# Patient Record
Sex: FEMALE | Race: BLACK OR AFRICAN AMERICAN | NOT HISPANIC OR LATINO | Employment: FULL TIME | URBAN - METROPOLITAN AREA
[De-identification: names, ages, dates, MRNs, and addresses within clinical notes are randomized per-mention and may not be internally consistent; named-entity substitution may affect disease eponyms.]

---

## 2023-02-26 PROBLEM — R73.03 PREDIABETES: Status: ACTIVE | Noted: 2023-02-26

## 2023-02-26 PROBLEM — M21.42 FLAT FEET: Status: ACTIVE | Noted: 2023-02-26

## 2023-02-26 PROBLEM — R10.2 PELVIC PRESSURE IN FEMALE: Status: ACTIVE | Noted: 2023-02-26

## 2023-02-26 PROBLEM — M25.562 PAIN IN LEFT KNEE: Status: ACTIVE | Noted: 2023-02-26

## 2023-02-26 PROBLEM — M25.561 PAIN IN RIGHT KNEE: Status: ACTIVE | Noted: 2023-02-26

## 2023-02-26 PROBLEM — R10.9 LEFT FLANK PAIN: Status: ACTIVE | Noted: 2023-02-26

## 2023-02-26 PROBLEM — R53.83 FATIGUE: Status: ACTIVE | Noted: 2023-02-26

## 2023-02-26 PROBLEM — M25.462 SWOLLEN L KNEE: Status: ACTIVE | Noted: 2023-02-26

## 2023-02-26 PROBLEM — G47.30 SLEEP-DISORDERED BREATHING: Status: ACTIVE | Noted: 2023-02-26

## 2023-02-26 PROBLEM — E55.9 VITAMIN D DEFICIENCY: Status: ACTIVE | Noted: 2023-02-26

## 2023-02-26 PROBLEM — E66.01 OBESITY, MORBID, BMI 50 OR HIGHER (MULTI): Status: ACTIVE | Noted: 2023-02-26

## 2023-02-26 PROBLEM — I10 ESSENTIAL HYPERTENSION: Status: ACTIVE | Noted: 2023-02-26

## 2023-02-26 PROBLEM — G89.29 CHRONIC LOW BACK PAIN: Status: ACTIVE | Noted: 2023-02-26

## 2023-02-26 PROBLEM — R29.898 TRANSIENT WEAKNESS OF LEFT LOWER EXTREMITY: Status: ACTIVE | Noted: 2023-02-26

## 2023-02-26 PROBLEM — N18.31 STAGE 3A CHRONIC KIDNEY DISEASE (MULTI): Status: ACTIVE | Noted: 2023-02-26

## 2023-02-26 PROBLEM — M54.50 CHRONIC LOW BACK PAIN: Status: ACTIVE | Noted: 2023-02-26

## 2023-02-26 PROBLEM — G47.30 APNEA, SLEEP: Status: ACTIVE | Noted: 2023-02-26

## 2023-02-26 PROBLEM — R60.0 LOWER LEG EDEMA: Status: ACTIVE | Noted: 2023-02-26

## 2023-02-26 PROBLEM — R35.0 URINARY FREQUENCY: Status: ACTIVE | Noted: 2023-02-26

## 2023-02-26 PROBLEM — R46.89 NON-COMPLIANT BEHAVIOR: Status: ACTIVE | Noted: 2023-02-26

## 2023-02-26 PROBLEM — R40.0 HAS DAYTIME DROWSINESS: Status: ACTIVE | Noted: 2023-02-26

## 2023-02-26 PROBLEM — R35.1 NOCTURIA: Status: ACTIVE | Noted: 2023-02-26

## 2023-02-26 PROBLEM — E03.9 HYPOTHYROIDISM: Status: ACTIVE | Noted: 2023-02-26

## 2023-02-26 PROBLEM — R26.2 DIFFICULTY WALKING: Status: ACTIVE | Noted: 2023-02-26

## 2023-02-26 PROBLEM — F43.9 STRESS: Status: ACTIVE | Noted: 2023-02-26

## 2023-02-26 PROBLEM — J40 BRONCHITIS: Status: ACTIVE | Noted: 2023-02-26

## 2023-02-26 PROBLEM — R11.0 NAUSEA IN ADULT: Status: ACTIVE | Noted: 2023-02-26

## 2023-02-26 PROBLEM — I10 UNCONTROLLED HYPERTENSION: Status: ACTIVE | Noted: 2023-02-26

## 2023-02-26 PROBLEM — H81.13 BENIGN PAROXYSMAL POSITIONAL VERTIGO, BILATERAL: Status: ACTIVE | Noted: 2023-02-26

## 2023-02-26 PROBLEM — R09.81 SINUS CONGESTION: Status: ACTIVE | Noted: 2023-02-26

## 2023-02-26 PROBLEM — R60.0 BILATERAL EDEMA OF LOWER EXTREMITY: Status: ACTIVE | Noted: 2023-02-26

## 2023-02-26 PROBLEM — E78.5 HYPERLIPIDEMIA: Status: ACTIVE | Noted: 2023-02-26

## 2023-02-26 PROBLEM — M79.672 INTRACTABLE LEFT HEEL PAIN: Status: ACTIVE | Noted: 2023-02-26

## 2023-02-26 PROBLEM — R29.898 WEAKNESS OF LEFT LOWER EXTREMITY: Status: ACTIVE | Noted: 2023-02-26

## 2023-02-26 PROBLEM — H53.9 CHANGES IN VISION: Status: ACTIVE | Noted: 2023-02-26

## 2023-02-26 PROBLEM — M25.552 HIP PAIN, LEFT: Status: ACTIVE | Noted: 2023-02-26

## 2023-02-26 PROBLEM — R06.83 SNORING: Status: ACTIVE | Noted: 2023-02-26

## 2023-02-26 PROBLEM — N39.0 UTI (URINARY TRACT INFECTION): Status: ACTIVE | Noted: 2023-02-26

## 2023-02-26 PROBLEM — R29.898 LEG WEAKNESS: Status: ACTIVE | Noted: 2023-02-26

## 2023-02-26 PROBLEM — M21.41 FLAT FEET: Status: ACTIVE | Noted: 2023-02-26

## 2023-02-26 PROBLEM — H40.9 GLAUCOMA: Status: ACTIVE | Noted: 2023-02-26

## 2023-02-26 PROBLEM — R05.9 COUGH: Status: ACTIVE | Noted: 2023-02-26

## 2023-02-26 PROBLEM — R31.29 MICROSCOPIC HEMATURIA: Status: ACTIVE | Noted: 2023-02-26

## 2023-02-26 RX ORDER — FLUTICASONE PROPIONATE 50 MCG
2 SPRAY, SUSPENSION (ML) NASAL DAILY
COMMUNITY
Start: 2021-10-27 | End: 2024-02-23 | Stop reason: SDUPTHER

## 2023-02-26 RX ORDER — ACETAMINOPHEN 500 MG
500-1000 TABLET ORAL
COMMUNITY
Start: 2022-08-04 | End: 2023-11-21 | Stop reason: SDUPTHER

## 2023-02-26 RX ORDER — AMLODIPINE BESYLATE 10 MG/1
10 TABLET ORAL DAILY
COMMUNITY
Start: 2018-04-20 | End: 2023-04-13 | Stop reason: SDUPTHER

## 2023-02-26 RX ORDER — TOPIRAMATE 50 MG/1
50 TABLET, FILM COATED ORAL 2 TIMES DAILY
COMMUNITY
Start: 2020-06-30 | End: 2023-04-13 | Stop reason: SDUPTHER

## 2023-02-26 RX ORDER — MECLIZINE HCL 25MG 25 MG/1
25 TABLET, CHEWABLE ORAL EVERY 8 HOURS
COMMUNITY
Start: 2022-08-11 | End: 2024-04-15 | Stop reason: ALTCHOICE

## 2023-02-26 RX ORDER — PROMETHAZINE HYDROCHLORIDE AND DEXTROMETHORPHAN HYDROBROMIDE 6.25; 15 MG/5ML; MG/5ML
10 SYRUP ORAL EVERY 8 HOURS PRN
COMMUNITY
Start: 2021-10-27 | End: 2023-06-08 | Stop reason: ALTCHOICE

## 2023-02-26 RX ORDER — ERGOCALCIFEROL 1.25 MG/1
1.25 CAPSULE ORAL
COMMUNITY
Start: 2018-02-08 | End: 2023-04-13 | Stop reason: SDUPTHER

## 2023-02-26 RX ORDER — LEVOTHYROXINE SODIUM 137 UG/1
137 TABLET ORAL DAILY
COMMUNITY
Start: 2018-12-26 | End: 2023-04-13 | Stop reason: SDUPTHER

## 2023-02-26 RX ORDER — ROSUVASTATIN CALCIUM 10 MG/1
10 TABLET, COATED ORAL NIGHTLY
COMMUNITY
Start: 2019-10-23 | End: 2023-06-08 | Stop reason: ALTCHOICE

## 2023-02-26 RX ORDER — METOPROLOL TARTRATE 25 MG/1
25 TABLET, FILM COATED ORAL 2 TIMES DAILY
COMMUNITY
Start: 2020-06-30 | End: 2023-04-13 | Stop reason: SDUPTHER

## 2023-02-26 RX ORDER — LISINOPRIL AND HYDROCHLOROTHIAZIDE 20; 25 MG/1; MG/1
1 TABLET ORAL DAILY
COMMUNITY
End: 2023-04-13 | Stop reason: SDUPTHER

## 2023-02-26 RX ORDER — ONDANSETRON 4 MG/1
4-8 TABLET, FILM COATED ORAL EVERY 8 HOURS PRN
COMMUNITY
Start: 2022-08-11 | End: 2023-06-08 | Stop reason: ALTCHOICE

## 2023-02-26 RX ORDER — GUAIFENESIN 400 MG/1
400 TABLET ORAL EVERY 4 HOURS PRN
COMMUNITY
Start: 2021-10-27 | End: 2023-06-08 | Stop reason: ALTCHOICE

## 2023-03-07 NOTE — PROGRESS NOTES
Subjective    Barbara Schultz is a 67 y.o. female who presents for No chief complaint on file..  HPI    An ROS was completed and all systems are negative with the exception of what's noted in the HPI.    Objective   Physical Exam    Assessment/Plan   Problem List Items Addressed This Visit    None

## 2023-03-08 ENCOUNTER — APPOINTMENT (OUTPATIENT)
Dept: PRIMARY CARE | Facility: CLINIC | Age: 68
End: 2023-03-08
Payer: COMMERCIAL

## 2023-04-13 ENCOUNTER — OFFICE VISIT (OUTPATIENT)
Dept: PRIMARY CARE | Facility: CLINIC | Age: 68
End: 2023-04-13
Payer: COMMERCIAL

## 2023-04-13 VITALS
RESPIRATION RATE: 12 BRPM | WEIGHT: 293 LBS | BODY MASS INDEX: 51.91 KG/M2 | TEMPERATURE: 97.2 F | SYSTOLIC BLOOD PRESSURE: 125 MMHG | HEART RATE: 69 BPM | DIASTOLIC BLOOD PRESSURE: 80 MMHG | OXYGEN SATURATION: 96 % | HEIGHT: 63 IN

## 2023-04-13 DIAGNOSIS — E55.9 VITAMIN D DEFICIENCY: ICD-10-CM

## 2023-04-13 DIAGNOSIS — Z12.11 COLON CANCER SCREENING: ICD-10-CM

## 2023-04-13 DIAGNOSIS — M54.42 LEFT-SIDED LOW BACK PAIN WITH LEFT-SIDED SCIATICA, UNSPECIFIED CHRONICITY: Primary | ICD-10-CM

## 2023-04-13 DIAGNOSIS — R51.9 NONINTRACTABLE HEADACHE, UNSPECIFIED CHRONICITY PATTERN, UNSPECIFIED HEADACHE TYPE: ICD-10-CM

## 2023-04-13 DIAGNOSIS — G47.33 OSA (OBSTRUCTIVE SLEEP APNEA): ICD-10-CM

## 2023-04-13 DIAGNOSIS — E78.5 HYPERLIPIDEMIA, UNSPECIFIED HYPERLIPIDEMIA TYPE: ICD-10-CM

## 2023-04-13 DIAGNOSIS — R32 URINARY INCONTINENCE, UNSPECIFIED TYPE: ICD-10-CM

## 2023-04-13 DIAGNOSIS — Z63.4 BEREAVEMENT: ICD-10-CM

## 2023-04-13 DIAGNOSIS — I10 ESSENTIAL HYPERTENSION: ICD-10-CM

## 2023-04-13 DIAGNOSIS — E66.01 OBESITY, MORBID, BMI 50 OR HIGHER (MULTI): ICD-10-CM

## 2023-04-13 DIAGNOSIS — E03.9 HYPOTHYROIDISM, UNSPECIFIED TYPE: ICD-10-CM

## 2023-04-13 PROCEDURE — 3079F DIAST BP 80-89 MM HG: CPT | Performed by: NURSE PRACTITIONER

## 2023-04-13 PROCEDURE — 99215 OFFICE O/P EST HI 40 MIN: CPT | Performed by: NURSE PRACTITIONER

## 2023-04-13 PROCEDURE — 3074F SYST BP LT 130 MM HG: CPT | Performed by: NURSE PRACTITIONER

## 2023-04-13 PROCEDURE — 1036F TOBACCO NON-USER: CPT | Performed by: NURSE PRACTITIONER

## 2023-04-13 PROCEDURE — 1159F MED LIST DOCD IN RCRD: CPT | Performed by: NURSE PRACTITIONER

## 2023-04-13 RX ORDER — ERGOCALCIFEROL 1.25 MG/1
1.25 CAPSULE ORAL
Qty: 6 CAPSULE | Refills: 2 | Status: SHIPPED | OUTPATIENT
Start: 2023-04-13 | End: 2024-02-23 | Stop reason: SDUPTHER

## 2023-04-13 RX ORDER — LISINOPRIL AND HYDROCHLOROTHIAZIDE 20; 25 MG/1; MG/1
1 TABLET ORAL DAILY
Qty: 90 TABLET | Refills: 3 | Status: SHIPPED | OUTPATIENT
Start: 2023-04-13 | End: 2023-06-08 | Stop reason: SDUPTHER

## 2023-04-13 RX ORDER — DULOXETIN HYDROCHLORIDE 30 MG/1
CAPSULE, DELAYED RELEASE ORAL
Qty: 60 CAPSULE | Refills: 1 | Status: SHIPPED | OUTPATIENT
Start: 2023-04-13 | End: 2024-04-16

## 2023-04-13 RX ORDER — LEVOTHYROXINE SODIUM 137 UG/1
137 TABLET ORAL DAILY
Qty: 90 TABLET | Refills: 3 | Status: SHIPPED | OUTPATIENT
Start: 2023-04-13 | End: 2023-05-16 | Stop reason: SDUPTHER

## 2023-04-13 RX ORDER — METOPROLOL TARTRATE 25 MG/1
25 TABLET, FILM COATED ORAL 2 TIMES DAILY
Qty: 90 TABLET | Refills: 3 | Status: SHIPPED | OUTPATIENT
Start: 2023-04-13 | End: 2023-09-08 | Stop reason: SDUPTHER

## 2023-04-13 RX ORDER — GABAPENTIN 100 MG/1
CAPSULE ORAL
Qty: 90 CAPSULE | Refills: 5 | Status: SHIPPED | OUTPATIENT
Start: 2023-04-13 | End: 2024-02-23 | Stop reason: SDUPTHER

## 2023-04-13 RX ORDER — TOPIRAMATE 50 MG/1
50 TABLET, FILM COATED ORAL 2 TIMES DAILY
Qty: 90 TABLET | Refills: 3 | Status: SHIPPED | OUTPATIENT
Start: 2023-04-13 | End: 2023-09-08 | Stop reason: SDUPTHER

## 2023-04-13 RX ORDER — AMLODIPINE BESYLATE 10 MG/1
10 TABLET ORAL DAILY
Qty: 90 TABLET | Refills: 3 | Status: SHIPPED | OUTPATIENT
Start: 2023-04-13 | End: 2023-06-08 | Stop reason: SDUPTHER

## 2023-04-13 SDOH — SOCIAL STABILITY - SOCIAL INSECURITY: DISSAPEARANCE AND DEATH OF FAMILY MEMBER: Z63.4

## 2023-04-13 ASSESSMENT — PATIENT HEALTH QUESTIONNAIRE - PHQ9
6. FEELING BAD ABOUT YOURSELF - OR THAT YOU ARE A FAILURE OR HAVE LET YOURSELF OR YOUR FAMILY DOWN: MORE THAN HALF THE DAYS
7. TROUBLE CONCENTRATING ON THINGS, SUCH AS READING THE NEWSPAPER OR WATCHING TELEVISION: MORE THAN HALF THE DAYS
3. TROUBLE FALLING OR STAYING ASLEEP: NEARLY EVERY DAY
8. MOVING OR SPEAKING SO SLOWLY THAT OTHER PEOPLE COULD HAVE NOTICED. OR THE OPPOSITE, BEING SO FIGETY OR RESTLESS THAT YOU HAVE BEEN MOVING AROUND A LOT MORE THAN USUAL: MORE THAN HALF THE DAYS
2. FEELING DOWN, DEPRESSED OR HOPELESS: MORE THAN HALF THE DAYS
10. IF YOU CHECKED OFF ANY PROBLEMS, HOW DIFFICULT HAVE THESE PROBLEMS MADE IT FOR YOU TO DO YOUR WORK, TAKE CARE OF THINGS AT HOME, OR GET ALONG WITH OTHER PEOPLE: SOMEWHAT DIFFICULT
SUM OF ALL RESPONSES TO PHQ9 QUESTIONS 1 & 2: 4
1. LITTLE INTEREST OR PLEASURE IN DOING THINGS: MORE THAN HALF THE DAYS
5. POOR APPETITE OR OVEREATING: MORE THAN HALF THE DAYS
SUM OF ALL RESPONSES TO PHQ QUESTIONS 1-9: 18
9. THOUGHTS THAT YOU WOULD BE BETTER OFF DEAD, OR OF HURTING YOURSELF: NOT AT ALL
4. FEELING TIRED OR HAVING LITTLE ENERGY: NEARLY EVERY DAY

## 2023-04-13 ASSESSMENT — ANXIETY QUESTIONNAIRES
2. NOT BEING ABLE TO STOP OR CONTROL WORRYING: MORE THAN HALF THE DAYS
GAD7 TOTAL SCORE: 17
7. FEELING AFRAID AS IF SOMETHING AWFUL MIGHT HAPPEN: MORE THAN HALF THE DAYS
1. FEELING NERVOUS, ANXIOUS, OR ON EDGE: NEARLY EVERY DAY
6. BECOMING EASILY ANNOYED OR IRRITABLE: MORE THAN HALF THE DAYS
3. WORRYING TOO MUCH ABOUT DIFFERENT THINGS: NEARLY EVERY DAY
IF YOU CHECKED OFF ANY PROBLEMS ON THIS QUESTIONNAIRE, HOW DIFFICULT HAVE THESE PROBLEMS MADE IT FOR YOU TO DO YOUR WORK, TAKE CARE OF THINGS AT HOME, OR GET ALONG WITH OTHER PEOPLE: SOMEWHAT DIFFICULT
5. BEING SO RESTLESS THAT IT IS HARD TO SIT STILL: MORE THAN HALF THE DAYS
4. TROUBLE RELAXING: NEARLY EVERY DAY

## 2023-04-13 ASSESSMENT — LIFESTYLE VARIABLES
HOW OFTEN DO YOU HAVE A DRINK CONTAINING ALCOHOL: NEVER
AUDIT-C TOTAL SCORE: 0
SKIP TO QUESTIONS 9-10: 1
HOW OFTEN DO YOU HAVE SIX OR MORE DRINKS ON ONE OCCASION: NEVER
HOW MANY STANDARD DRINKS CONTAINING ALCOHOL DO YOU HAVE ON A TYPICAL DAY: PATIENT DOES NOT DRINK

## 2023-04-13 NOTE — PROGRESS NOTES
"Subjective   Patient ID: Barbara Schultz is a 68 y.o. female who presents for Back Pain.    HPI   The patient is not sleeping well at night with  going to the bathroom all the time .  She has pain in the left lower abdomen and the left lower back   She has to push out of the bed and eases out of it .   She wants to have an fmla filled out    She needs time off her job . She is stressed at this time .  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  She has urge incontinence and stress incontinence   She has been having sleeping problems since menopause and getting up at night   She would go to bed around 10 am and wake up at 2 am and stay up   She snores at night, she is tired during the day   Her children hear her snoring at night .  She is exhausted during the day   Her sleep study was done in January   She is interested in doing the bariatric surgery now .   She is due for a colonoscopy   She also is due for labs   Her fatigue may in part be due to sleep apnea being untreated . I suggested to order a cpap for her based on her sleep study done in January       Review of Systems    Objective   /80   Pulse 69   Temp 36.2 °C (97.2 °F)   Resp 12   Ht 1.6 m (5' 3\")   Wt 136 kg (299 lb)   SpO2 96%   BMI 52.97 kg/m²     Physical Exam  Vitals and nursing note reviewed.   Constitutional:       Appearance: Normal appearance.   HENT:      Head: Normocephalic.      Nose: Nose normal.   Eyes:      Conjunctiva/sclera: Conjunctivae normal.      Pupils: Pupils are equal, round, and reactive to light.   Cardiovascular:      Rate and Rhythm: Normal rate and regular rhythm.   Pulmonary:      Effort: Pulmonary effort is normal.      Breath sounds: Normal breath sounds.   Abdominal:      General: Abdomen is flat. Bowel sounds are normal.      Palpations: Abdomen is soft.   Musculoskeletal:         General: Normal range of motion.      Cervical back: Normal range of " motion and neck supple.      Comments: Has difficulty getting up and down from the chair   Lower back pain with rom    Skin:     General: Skin is warm and dry.   Neurological:      General: No focal deficit present.      Mental Status: She is alert and oriented to person, place, and time. Mental status is at baseline.   Psychiatric:         Mood and Affect: Mood normal.         Behavior: Behavior normal.         Thought Content: Thought content normal.         Judgment: Judgment normal.         Assessment/Plan   Problem List Items Addressed This Visit          Circulatory    Essential hypertension    Relevant Medications    amLODIPine (Norvasc) 10 mg tablet    metoprolol tartrate (Lopressor) 25 mg tablet    Other Relevant Orders    TSH with reflex to Free T4 if abnormal    Comprehensive Metabolic Panel    Uric Acid    Urinalysis with Reflex Microscopic    CBC       Endocrine/Metabolic    Hypothyroidism    Relevant Medications    levothyroxine (Synthroid, Levoxyl) 137 mcg tablet    Obesity, morbid, BMI 50 or higher (CMS/HCC)    Relevant Orders    Referral to Bariatric Surgery    Hemoglobin A1c    LDL cholesterol, direct    Vitamin D deficiency    Relevant Medications    ergocalciferol (Vitamin D-2) 1.25 MG (52349 UT) capsule       Other    Hyperlipidemia    Relevant Medications    lisinopriL-hydrochlorothiazide 20-25 mg tablet     Other Visit Diagnoses       Left-sided low back pain with left-sided sciatica, unspecified chronicity    -  Primary    Relevant Medications    gabapentin (Neurontin) 100 mg capsule    DULoxetine (Cymbalta) 30 mg DR capsule    Other Relevant Orders    Disability Placard    FL pain management TC    Nonintractable headache, unspecified chronicity pattern, unspecified headache type        Relevant Medications    topiramate (Topamax) 50 mg tablet    Urinary incontinence, unspecified type        JAVIER (obstructive sleep apnea)        Relevant Orders    Positive Airway Pressure (PAP) Therapy     Referral to Adult Sleep Medicine    Colon cancer screening        Relevant Orders    Colonoscopy    Bereavement

## 2023-04-13 NOTE — PATIENT INSTRUCTIONS
Get your fmla from [facility]   I  need information for the paperwork   Monday Tuesday and Friday   Back pain   I would recommend you do the bariatric program before you retire  Once you get the weight off you will feel so much better    If you go to a recreation center get on a bicycle and use that for exercise   Do 1/2 hour  do water exercise   Avoid starches and sweets do some favorites   1200 keren daily    You can maintain weight loss  Do protein smoothies   I use natures bounty choclolate if you like that    You need time to deal with losses   Look at grief share.org   Think about counseling about losses   You may be a bit depresses

## 2023-04-13 NOTE — LETTER
April 13, 2023     Patient: Barbara Schultz   YOB: 1955   Date of Visit: 4/13/2023       To Whom It May Concern:    Barbara Schultz was seen in my clinic on 4/13/2023. Please excuse Barbara for her absence from work on this day to make the appointment.    If you have any questions or concerns, please don't hesitate to call.         Sincerely,         Rhonda Bhat, MAOSN-CNP

## 2023-05-15 DIAGNOSIS — E03.9 HYPOTHYROIDISM, UNSPECIFIED TYPE: ICD-10-CM

## 2023-05-16 RX ORDER — LEVOTHYROXINE SODIUM 137 UG/1
137 TABLET ORAL DAILY
Qty: 30 TABLET | Refills: 0 | Status: SHIPPED | OUTPATIENT
Start: 2023-05-16 | End: 2023-07-11 | Stop reason: SDUPTHER

## 2023-06-08 ENCOUNTER — OFFICE VISIT (OUTPATIENT)
Dept: PRIMARY CARE | Facility: CLINIC | Age: 68
End: 2023-06-08
Payer: COMMERCIAL

## 2023-06-08 VITALS
HEIGHT: 63 IN | WEIGHT: 293 LBS | TEMPERATURE: 97.5 F | BODY MASS INDEX: 51.91 KG/M2 | DIASTOLIC BLOOD PRESSURE: 88 MMHG | SYSTOLIC BLOOD PRESSURE: 138 MMHG | HEART RATE: 72 BPM | OXYGEN SATURATION: 92 %

## 2023-06-08 DIAGNOSIS — G47.33 OSA (OBSTRUCTIVE SLEEP APNEA): ICD-10-CM

## 2023-06-08 DIAGNOSIS — E78.5 HYPERLIPIDEMIA, UNSPECIFIED HYPERLIPIDEMIA TYPE: ICD-10-CM

## 2023-06-08 DIAGNOSIS — E66.1 CLASS 3 DRUG-INDUCED OBESITY WITH SERIOUS COMORBIDITY AND BODY MASS INDEX (BMI) OF 50.0 TO 59.9 IN ADULT (MULTI): ICD-10-CM

## 2023-06-08 DIAGNOSIS — M54.42 CHRONIC LEFT-SIDED LOW BACK PAIN WITH LEFT-SIDED SCIATICA: Primary | ICD-10-CM

## 2023-06-08 DIAGNOSIS — M25.561 PAIN IN BOTH KNEES, UNSPECIFIED CHRONICITY: ICD-10-CM

## 2023-06-08 DIAGNOSIS — M25.562 PAIN IN BOTH KNEES, UNSPECIFIED CHRONICITY: ICD-10-CM

## 2023-06-08 DIAGNOSIS — G89.29 CHRONIC LEFT-SIDED LOW BACK PAIN WITH LEFT-SIDED SCIATICA: Primary | ICD-10-CM

## 2023-06-08 DIAGNOSIS — Z12.31 ENCOUNTER FOR SCREENING MAMMOGRAM FOR MALIGNANT NEOPLASM OF BREAST: ICD-10-CM

## 2023-06-08 DIAGNOSIS — I10 ESSENTIAL HYPERTENSION: ICD-10-CM

## 2023-06-08 PROCEDURE — 1159F MED LIST DOCD IN RCRD: CPT | Performed by: NURSE PRACTITIONER

## 2023-06-08 PROCEDURE — 1160F RVW MEDS BY RX/DR IN RCRD: CPT | Performed by: NURSE PRACTITIONER

## 2023-06-08 PROCEDURE — 3075F SYST BP GE 130 - 139MM HG: CPT | Performed by: NURSE PRACTITIONER

## 2023-06-08 PROCEDURE — 99215 OFFICE O/P EST HI 40 MIN: CPT | Performed by: NURSE PRACTITIONER

## 2023-06-08 PROCEDURE — 1036F TOBACCO NON-USER: CPT | Performed by: NURSE PRACTITIONER

## 2023-06-08 PROCEDURE — 3079F DIAST BP 80-89 MM HG: CPT | Performed by: NURSE PRACTITIONER

## 2023-06-08 PROCEDURE — 3008F BODY MASS INDEX DOCD: CPT | Performed by: NURSE PRACTITIONER

## 2023-06-08 RX ORDER — TRIAMCINOLONE ACETONIDE 1 MG/G
OINTMENT TOPICAL
COMMUNITY
Start: 2023-04-20 | End: 2024-04-15 | Stop reason: ALTCHOICE

## 2023-06-08 RX ORDER — AMLODIPINE BESYLATE 10 MG/1
10 TABLET ORAL DAILY
Qty: 90 TABLET | Refills: 3 | Status: SHIPPED | OUTPATIENT
Start: 2023-06-08 | End: 2024-02-23 | Stop reason: SDUPTHER

## 2023-06-08 RX ORDER — KETOCONAZOLE 20 MG/ML
SHAMPOO, SUSPENSION TOPICAL
COMMUNITY
Start: 2023-04-20 | End: 2024-04-03 | Stop reason: SDUPTHER

## 2023-06-08 RX ORDER — LISINOPRIL AND HYDROCHLOROTHIAZIDE 20; 25 MG/1; MG/1
1 TABLET ORAL DAILY
Qty: 90 TABLET | Refills: 3 | Status: SHIPPED | OUTPATIENT
Start: 2023-06-08 | End: 2024-02-23 | Stop reason: SDUPTHER

## 2023-06-08 NOTE — PATIENT INSTRUCTIONS
I put in lab work   You can get it   Take it easy at work and you will   Every other month take a week off   Let me know

## 2023-06-08 NOTE — PROGRESS NOTES
"Subjective   Patient ID: Barbara Schultz is a 68 y.o. female who presents for Follow-up.    HPI   The patient has hypertension and hyperlipidemia   She has randy   She had a sleep study they need the  numbers of the  cpap ranges  they were not on the prescription  She us up and down all night long  and does not sleep well and is tired during the day , her o2 roxanna was severely low in the 60's .S he also snores when she sleeps .  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 [employer]   She feels that she may need some time off   Everybody is being overworked   She is part of management and is not in the union   She is taking her medications for hypertension   Her [facility] is not until October   She would like a letter that she has been here for the visit and to be off for the next week  Her job is exhausting and very stressful    She needs a disability placard for her knees and back  She has had pain in both for quite a while   She is due for mammogram  She has grandchildren that keep asking her for money   Review of Systems    Objective   /88   Pulse 72   Temp 36.4 °C (97.5 °F)   Ht 1.6 m (5' 3\")   Wt 136 kg (299 lb)   SpO2 92%   BMI 52.97 kg/m²       Physical Exam  Vitals and nursing note reviewed.   Constitutional:       Appearance: Normal appearance. She is obese.   HENT:      Head: Normocephalic.      Nose: Nose normal.   Eyes:      Conjunctiva/sclera: Conjunctivae normal.      Pupils: Pupils are equal, round, and reactive to light.   Cardiovascular:      Rate and Rhythm: Normal rate and regular rhythm.   Pulmonary:      Effort: Pulmonary effort is normal.      Breath sounds: Normal breath sounds.   Abdominal:      General: Abdomen is flat. Bowel sounds are normal.      Palpations: Abdomen is soft.   Musculoskeletal:         General: Swelling and tenderness present. Normal range of " motion.      Cervical back: Normal range of motion and neck supple.      Comments: Enlarged knees   Has difficulty getting up from seated position    Skin:     General: Skin is warm and dry.   Neurological:      General: No focal deficit present.      Mental Status: She is alert and oriented to person, place, and time. Mental status is at baseline.   Psychiatric:         Mood and Affect: Mood normal.         Behavior: Behavior normal.         Thought Content: Thought content normal.         Judgment: Judgment normal.     Assessment/Plan   Problem List Items Addressed This Visit       Chronic low back pain - Primary    Relevant Orders    Disability Placard    Essential hypertension    Relevant Medications    amLODIPine (Norvasc) 10 mg tablet    Hyperlipidemia    Relevant Medications    lisinopriL-hydrochlorothiazide 20-25 mg tablet     Other Visit Diagnoses       Encounter for screening mammogram for malignant neoplasm of breast        Relevant Orders    BI mammo bilateral screening tomosynthesis    JAVIER (obstructive sleep apnea)        Relevant Orders    General supply request cpap pressure settings 5 to 15 cm h20 with heated humidification , use nasal pillow for mask    Pain in both knees, unspecified chronicity        Relevant Orders    Disability Placard    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

## 2023-06-20 ENCOUNTER — LAB (OUTPATIENT)
Dept: LAB | Facility: LAB | Age: 68
End: 2023-06-20
Payer: COMMERCIAL

## 2023-06-20 DIAGNOSIS — E66.01 OBESITY, MORBID, BMI 50 OR HIGHER (MULTI): ICD-10-CM

## 2023-06-20 DIAGNOSIS — I10 ESSENTIAL HYPERTENSION: ICD-10-CM

## 2023-06-20 LAB
ALANINE AMINOTRANSFERASE (SGPT) (U/L) IN SER/PLAS: 10 U/L (ref 7–45)
ALBUMIN (G/DL) IN SER/PLAS: 4.1 G/DL (ref 3.4–5)
ALKALINE PHOSPHATASE (U/L) IN SER/PLAS: 71 U/L (ref 33–136)
ANION GAP IN SER/PLAS: 14 MMOL/L (ref 10–20)
APPEARANCE, URINE: NORMAL
ASPARTATE AMINOTRANSFERASE (SGOT) (U/L) IN SER/PLAS: 12 U/L (ref 9–39)
BILIRUBIN TOTAL (MG/DL) IN SER/PLAS: 0.4 MG/DL (ref 0–1.2)
BILIRUBIN, URINE: NEGATIVE
BLOOD, URINE: NEGATIVE
CALCIUM (MG/DL) IN SER/PLAS: 10.4 MG/DL (ref 8.6–10.6)
CARBON DIOXIDE, TOTAL (MMOL/L) IN SER/PLAS: 26 MMOL/L (ref 21–32)
CHLORIDE (MMOL/L) IN SER/PLAS: 104 MMOL/L (ref 98–107)
CHOLESTEROL IN LDL (MG/DL) IN SER/PLAS BY DIRECT ASSAY: 109 MG/DL (ref 0–129)
COLOR, URINE: YELLOW
CREATININE (MG/DL) IN SER/PLAS: 1.09 MG/DL (ref 0.5–1.05)
ERYTHROCYTE DISTRIBUTION WIDTH (RATIO) BY AUTOMATED COUNT: 13.1 % (ref 11.5–14.5)
ERYTHROCYTE MEAN CORPUSCULAR HEMOGLOBIN CONCENTRATION (G/DL) BY AUTOMATED: 34.3 G/DL (ref 32–36)
ERYTHROCYTE MEAN CORPUSCULAR VOLUME (FL) BY AUTOMATED COUNT: 88 FL (ref 80–100)
ERYTHROCYTES (10*6/UL) IN BLOOD BY AUTOMATED COUNT: 3.98 X10E12/L (ref 4–5.2)
ESTIMATED AVERAGE GLUCOSE FOR HBA1C: 117 MG/DL
GFR FEMALE: 56 ML/MIN/1.73M2
GLUCOSE (MG/DL) IN SER/PLAS: 95 MG/DL (ref 74–99)
GLUCOSE, URINE: NEGATIVE MG/DL
HEMATOCRIT (%) IN BLOOD BY AUTOMATED COUNT: 35 % (ref 36–46)
HEMOGLOBIN (G/DL) IN BLOOD: 12 G/DL (ref 12–16)
HEMOGLOBIN A1C/HEMOGLOBIN TOTAL IN BLOOD: 5.7 %
KETONES, URINE: NEGATIVE MG/DL
LEUKOCYTE ESTERASE, URINE: NEGATIVE
LEUKOCYTES (10*3/UL) IN BLOOD BY AUTOMATED COUNT: 6.4 X10E9/L (ref 4.4–11.3)
NITRITE, URINE: NEGATIVE
NRBC (PER 100 WBCS) BY AUTOMATED COUNT: 0 /100 WBC (ref 0–0)
PH, URINE: 6 (ref 5–8)
PLATELETS (10*3/UL) IN BLOOD AUTOMATED COUNT: 273 X10E9/L (ref 150–450)
POTASSIUM (MMOL/L) IN SER/PLAS: 3.6 MMOL/L (ref 3.5–5.3)
PROTEIN TOTAL: 8 G/DL (ref 6.4–8.2)
PROTEIN, URINE: NEGATIVE MG/DL
SODIUM (MMOL/L) IN SER/PLAS: 140 MMOL/L (ref 136–145)
SPECIFIC GRAVITY, URINE: 1.01 (ref 1–1.03)
THYROTROPIN (MIU/L) IN SER/PLAS BY DETECTION LIMIT <= 0.05 MIU/L: 0.53 MIU/L (ref 0.44–3.98)
URATE (MG/DL) IN SER/PLAS: 7.8 MG/DL (ref 2.3–6.7)
UREA NITROGEN (MG/DL) IN SER/PLAS: 18 MG/DL (ref 6–23)
UROBILINOGEN, URINE: <2 MG/DL (ref 0–1.9)

## 2023-06-20 PROCEDURE — 83721 ASSAY OF BLOOD LIPOPROTEIN: CPT

## 2023-06-20 PROCEDURE — 81003 URINALYSIS AUTO W/O SCOPE: CPT

## 2023-06-20 PROCEDURE — 84443 ASSAY THYROID STIM HORMONE: CPT

## 2023-06-20 PROCEDURE — 84550 ASSAY OF BLOOD/URIC ACID: CPT

## 2023-06-20 PROCEDURE — 83036 HEMOGLOBIN GLYCOSYLATED A1C: CPT

## 2023-06-20 PROCEDURE — 80053 COMPREHEN METABOLIC PANEL: CPT

## 2023-06-20 PROCEDURE — 85027 COMPLETE CBC AUTOMATED: CPT

## 2023-06-20 PROCEDURE — 36415 COLL VENOUS BLD VENIPUNCTURE: CPT

## 2023-06-24 ENCOUNTER — TELEPHONE (OUTPATIENT)
Dept: PRIMARY CARE | Facility: CLINIC | Age: 68
End: 2023-06-24
Payer: COMMERCIAL

## 2023-06-26 NOTE — TELEPHONE ENCOUNTER
Patient is aware and will continue with her water intake and watching her BP. The pt wanted to know how her Potassium levels are and if you have any recommendations if they are not at the right level. Please advise.

## 2023-07-11 DIAGNOSIS — E03.9 HYPOTHYROIDISM, UNSPECIFIED TYPE: ICD-10-CM

## 2023-07-13 RX ORDER — LEVOTHYROXINE SODIUM 137 UG/1
137 TABLET ORAL DAILY
Qty: 90 TABLET | Refills: 0 | Status: SHIPPED | OUTPATIENT
Start: 2023-07-13 | End: 2024-02-23 | Stop reason: SDUPTHER

## 2023-09-07 ENCOUNTER — HOSPITAL ENCOUNTER (OUTPATIENT)
Dept: DATA CONVERSION | Facility: HOSPITAL | Age: 68
End: 2023-09-07
Attending: INTERNAL MEDICINE
Payer: COMMERCIAL

## 2023-09-07 DIAGNOSIS — E78.00 PURE HYPERCHOLESTEROLEMIA, UNSPECIFIED: ICD-10-CM

## 2023-09-07 DIAGNOSIS — Z86.010 PERSONAL HISTORY OF COLONIC POLYPS: ICD-10-CM

## 2023-09-07 DIAGNOSIS — K63.5 POLYP OF COLON: ICD-10-CM

## 2023-09-07 DIAGNOSIS — D12.6 BENIGN NEOPLASM OF COLON, UNSPECIFIED: ICD-10-CM

## 2023-09-07 DIAGNOSIS — Z12.11 ENCOUNTER FOR SCREENING FOR MALIGNANT NEOPLASM OF COLON: ICD-10-CM

## 2023-09-07 DIAGNOSIS — K57.30 DIVERTICULOSIS OF LARGE INTESTINE WITHOUT PERFORATION OR ABSCESS WITHOUT BLEEDING: ICD-10-CM

## 2023-09-07 DIAGNOSIS — D12.3 BENIGN NEOPLASM OF TRANSVERSE COLON: ICD-10-CM

## 2023-09-07 DIAGNOSIS — E03.9 HYPOTHYROIDISM, UNSPECIFIED: ICD-10-CM

## 2023-09-07 DIAGNOSIS — E66.01 MORBID (SEVERE) OBESITY DUE TO EXCESS CALORIES (MULTI): ICD-10-CM

## 2023-09-07 RX ORDER — SODIUM PICOSULFATE, MAGNESIUM OXIDE, AND ANHYDROUS CITRIC ACID 12; 3.5; 1 G/175ML; G/175ML; MG/175ML
LIQUID ORAL
COMMUNITY
Start: 2023-07-13

## 2023-09-07 RX ORDER — LATANOPROST 50 UG/ML
1 SOLUTION/ DROPS OPHTHALMIC NIGHTLY
COMMUNITY
Start: 2023-08-16

## 2023-09-08 ENCOUNTER — OFFICE VISIT (OUTPATIENT)
Dept: PRIMARY CARE | Facility: CLINIC | Age: 68
End: 2023-09-08
Payer: COMMERCIAL

## 2023-09-08 VITALS
OXYGEN SATURATION: 98 % | TEMPERATURE: 98 F | HEART RATE: 75 BPM | RESPIRATION RATE: 14 BRPM | HEIGHT: 63 IN | SYSTOLIC BLOOD PRESSURE: 120 MMHG | BODY MASS INDEX: 51.91 KG/M2 | WEIGHT: 293 LBS | DIASTOLIC BLOOD PRESSURE: 70 MMHG

## 2023-09-08 DIAGNOSIS — I10 ESSENTIAL HYPERTENSION: Primary | ICD-10-CM

## 2023-09-08 DIAGNOSIS — R60.0 LOWER LEG EDEMA: ICD-10-CM

## 2023-09-08 DIAGNOSIS — E66.9 LYMPHEDEMA ASSOCIATED WITH OBESITY: ICD-10-CM

## 2023-09-08 DIAGNOSIS — E66.01 OBESITY, MORBID, BMI 50 OR HIGHER (MULTI): ICD-10-CM

## 2023-09-08 DIAGNOSIS — I89.0 LYMPHEDEMA ASSOCIATED WITH OBESITY: ICD-10-CM

## 2023-09-08 DIAGNOSIS — R51.9 NONINTRACTABLE HEADACHE, UNSPECIFIED CHRONICITY PATTERN, UNSPECIFIED HEADACHE TYPE: ICD-10-CM

## 2023-09-08 DIAGNOSIS — Z23 NEED FOR INFLUENZA VACCINATION: ICD-10-CM

## 2023-09-08 DIAGNOSIS — R06.09 DYSPNEA ON EXERTION: ICD-10-CM

## 2023-09-08 PROCEDURE — 1036F TOBACCO NON-USER: CPT | Performed by: NURSE PRACTITIONER

## 2023-09-08 PROCEDURE — 3074F SYST BP LT 130 MM HG: CPT | Performed by: NURSE PRACTITIONER

## 2023-09-08 PROCEDURE — 90471 IMMUNIZATION ADMIN: CPT | Performed by: NURSE PRACTITIONER

## 2023-09-08 PROCEDURE — 99214 OFFICE O/P EST MOD 30 MIN: CPT | Performed by: NURSE PRACTITIONER

## 2023-09-08 PROCEDURE — 1160F RVW MEDS BY RX/DR IN RCRD: CPT | Performed by: NURSE PRACTITIONER

## 2023-09-08 PROCEDURE — 1159F MED LIST DOCD IN RCRD: CPT | Performed by: NURSE PRACTITIONER

## 2023-09-08 PROCEDURE — 1126F AMNT PAIN NOTED NONE PRSNT: CPT | Performed by: NURSE PRACTITIONER

## 2023-09-08 PROCEDURE — 3078F DIAST BP <80 MM HG: CPT | Performed by: NURSE PRACTITIONER

## 2023-09-08 PROCEDURE — 3008F BODY MASS INDEX DOCD: CPT | Performed by: NURSE PRACTITIONER

## 2023-09-08 PROCEDURE — 90662 IIV NO PRSV INCREASED AG IM: CPT | Performed by: NURSE PRACTITIONER

## 2023-09-08 RX ORDER — METOPROLOL TARTRATE 25 MG/1
25 TABLET, FILM COATED ORAL 2 TIMES DAILY
Qty: 90 TABLET | Refills: 2 | Status: SHIPPED | OUTPATIENT
Start: 2023-09-08 | End: 2024-02-23 | Stop reason: SDUPTHER

## 2023-09-08 RX ORDER — TOPIRAMATE 50 MG/1
50 TABLET, FILM COATED ORAL 2 TIMES DAILY
Qty: 90 TABLET | Refills: 2 | Status: SHIPPED | OUTPATIENT
Start: 2023-09-08 | End: 2024-01-04 | Stop reason: SDUPTHER

## 2023-09-08 NOTE — PATIENT INSTRUCTIONS
There is an santos called  lose it  to help you lose weight   Try to stay away from fast food   Lymphedema is when you have a lot of swelling in the legs that stays there   There is lymphedema therapy and a lymphedema pump maybe you can get at home to use   Elevatte the legs   Use compression hose or an  ace wrap to give compression     I would like to get an echo to make sure your heart is ok  one uses a scanner   Call for the [facility] to schedule it    Salt fat and sweet are addictive taste that is what fast food is selling   Do the therapy for the legs it will help   Once you retire you can focus on your health   Follow up in 3 months   Have someone to help you

## 2023-09-08 NOTE — PROGRESS NOTES
"Subjective   Patient ID: Barbara Schultz is a 68 y.o. female who presents for Follow-up (Pt is here for HTN fuv.).  T  HPI   The patient got a colonoscopy recently , it went well , she had some polyps   Her renal function has elevated in the last 2 years , she is not using nsaids .  She takes tylenol , when she needs something for pain  I had advised her to reduce the 50 mg of hydrochlorothiazide due to her age and elevated renal function , She now is complaining of increased swelling .    She has sleep apnea and has had one . She is negotiating a payment plan .   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   She needs to get medications for her migraines and her blood pressure   I advised her to get serious about weight loss and also to get an echo .   We can discuss using chlorthalidone when she needs to get a refill.   Review of Systems    Objective   /70 (Patient Position: Sitting)   Pulse 75   Temp 36.7 °C (98 °F)   Resp 14   Ht 1.6 m (5' 3\")   Wt 135 kg (296 lb 12.8 oz)   SpO2 98%   BMI 52.58 kg/m²     Physical Exam    Assessment/Plan   Problem List Items Addressed This Visit       Essential hypertension - Primary    Relevant Medications    metoprolol tartrate (Lopressor) 25 mg tablet    Lower leg edema    Relevant Orders    Transthoracic Echo (TTE) Complete    Obesity, morbid, BMI 50 or higher (CMS/[facility])     Other Visit Diagnoses       Nonintractable headache, unspecified chronicity pattern, unspecified headache type        Relevant Medications    topiramate (Topamax) 50 mg tablet    Dyspnea on exertion        Relevant Orders    Transthoracic Echo (TTE) Complete    Lymphedema associated with obesity        Relevant Orders    Referral to Physical Therapy    Need for influenza vaccination                   "

## 2023-09-12 LAB
COMPLETE PATHOLOGY REPORT: NORMAL
CONVERTED CLINICAL DIAGNOSIS-HISTORY: NORMAL
CONVERTED FINAL DIAGNOSIS: NORMAL
CONVERTED FINAL REPORT PDF LINK TO COPY AND PASTE: NORMAL
CONVERTED GROSS DESCRIPTION: NORMAL

## 2023-09-15 DIAGNOSIS — R05.9 COUGH, UNSPECIFIED TYPE: ICD-10-CM

## 2023-09-15 RX ORDER — PROMETHAZINE HYDROCHLORIDE 6.25 MG/5ML
SYRUP ORAL
COMMUNITY
End: 2023-09-15 | Stop reason: SDUPTHER

## 2023-09-15 NOTE — TELEPHONE ENCOUNTER
Patient requested the following medication refill:  Promethazine Syrup    If approved , Please sign and send to pharmacy listed:   [id number] - [place], [place] - [address] [facility] [address] [facility] [address]  Phone: [phone]  Fax: [phone]

## 2023-09-16 RX ORDER — PROMETHAZINE HYDROCHLORIDE 6.25 MG/5ML
12.5 SYRUP ORAL EVERY 8 HOURS PRN
Qty: 120 ML | Refills: 0 | Status: SHIPPED | OUTPATIENT
Start: 2023-09-16 | End: 2024-04-03 | Stop reason: SDUPTHER

## 2023-09-18 ENCOUNTER — TELEPHONE (OUTPATIENT)
Dept: PRIMARY CARE | Facility: CLINIC | Age: 68
End: 2023-09-18

## 2023-11-21 ENCOUNTER — ANCILLARY PROCEDURE (OUTPATIENT)
Dept: RADIOLOGY | Facility: CLINIC | Age: 68
End: 2023-11-21
Payer: COMMERCIAL

## 2023-11-21 DIAGNOSIS — M25.552 HIP PAIN, LEFT: ICD-10-CM

## 2023-11-21 DIAGNOSIS — Z12.31 ENCOUNTER FOR SCREENING MAMMOGRAM FOR MALIGNANT NEOPLASM OF BREAST: ICD-10-CM

## 2023-11-21 PROCEDURE — 77063 BREAST TOMOSYNTHESIS BI: CPT

## 2023-11-21 PROCEDURE — 77067 SCR MAMMO BI INCL CAD: CPT | Performed by: RADIOLOGY

## 2023-11-21 PROCEDURE — 77063 BREAST TOMOSYNTHESIS BI: CPT | Performed by: RADIOLOGY

## 2023-11-21 PROCEDURE — 77067 SCR MAMMO BI INCL CAD: CPT

## 2023-11-21 RX ORDER — ACETAMINOPHEN 500 MG
TABLET ORAL
Qty: 30 TABLET | Refills: 1 | Status: SHIPPED | OUTPATIENT
Start: 2023-11-21 | End: 2024-04-03 | Stop reason: SDUPTHER

## 2023-12-15 ENCOUNTER — APPOINTMENT (OUTPATIENT)
Dept: PRIMARY CARE | Facility: CLINIC | Age: 68
End: 2023-12-15
Payer: COMMERCIAL

## 2024-01-04 DIAGNOSIS — R51.9 NONINTRACTABLE HEADACHE, UNSPECIFIED CHRONICITY PATTERN, UNSPECIFIED HEADACHE TYPE: ICD-10-CM

## 2024-01-07 RX ORDER — TOPIRAMATE 50 MG/1
50 TABLET, FILM COATED ORAL 2 TIMES DAILY
Qty: 90 TABLET | Refills: 2 | Status: SHIPPED | OUTPATIENT
Start: 2024-01-07 | End: 2024-02-23 | Stop reason: SDUPTHER

## 2024-02-19 ENCOUNTER — TELEPHONE (OUTPATIENT)
Dept: PRIMARY CARE | Facility: CLINIC | Age: 69
End: 2024-02-19

## 2024-02-19 DIAGNOSIS — R05.1 ACUTE COUGH: Primary | ICD-10-CM

## 2024-02-19 DIAGNOSIS — J40 BRONCHITIS: Primary | ICD-10-CM

## 2024-02-19 RX ORDER — PROMETHAZINE HYDROCHLORIDE AND DEXTROMETHORPHAN HYDROBROMIDE 6.25; 15 MG/5ML; MG/5ML
5 SYRUP ORAL EVERY 4 HOURS PRN
Qty: 120 ML | Refills: 0 | Status: SHIPPED | OUTPATIENT
Start: 2024-02-19 | End: 2024-03-20

## 2024-02-19 NOTE — TELEPHONE ENCOUNTER
Patient called in concerning her promethazine (Phenergan) 6.25 mg/5 mL patient sated that someone told her that they were sending that last night to be filled not sure who told her that but I am sending over to you explain to her that you are not in today.              Pharmacy    [facility] #[id number] - [place], [place] - [address] [facility] [address]  Phone: [phone]  Fax: [phone]

## 2024-02-23 ENCOUNTER — TELEMEDICINE (OUTPATIENT)
Dept: PRIMARY CARE | Facility: CLINIC | Age: 69
End: 2024-02-23
Payer: COMMERCIAL

## 2024-02-23 DIAGNOSIS — E55.9 VITAMIN D DEFICIENCY: ICD-10-CM

## 2024-02-23 DIAGNOSIS — I10 ESSENTIAL HYPERTENSION: ICD-10-CM

## 2024-02-23 DIAGNOSIS — M54.42 LEFT-SIDED LOW BACK PAIN WITH LEFT-SIDED SCIATICA, UNSPECIFIED CHRONICITY: ICD-10-CM

## 2024-02-23 DIAGNOSIS — L64.9 ANDROGENIC ALOPECIA: Primary | ICD-10-CM

## 2024-02-23 DIAGNOSIS — J30.9 ALLERGIC RHINITIS, UNSPECIFIED SEASONALITY, UNSPECIFIED TRIGGER: ICD-10-CM

## 2024-02-23 DIAGNOSIS — E03.9 HYPOTHYROIDISM, UNSPECIFIED TYPE: ICD-10-CM

## 2024-02-23 DIAGNOSIS — E78.5 HYPERLIPIDEMIA, UNSPECIFIED HYPERLIPIDEMIA TYPE: ICD-10-CM

## 2024-02-23 DIAGNOSIS — R51.9 NONINTRACTABLE HEADACHE, UNSPECIFIED CHRONICITY PATTERN, UNSPECIFIED HEADACHE TYPE: ICD-10-CM

## 2024-02-23 PROCEDURE — 1036F TOBACCO NON-USER: CPT | Performed by: NURSE PRACTITIONER

## 2024-02-23 PROCEDURE — 99214 OFFICE O/P EST MOD 30 MIN: CPT | Performed by: NURSE PRACTITIONER

## 2024-02-23 PROCEDURE — 3008F BODY MASS INDEX DOCD: CPT | Performed by: NURSE PRACTITIONER

## 2024-02-23 PROCEDURE — 1126F AMNT PAIN NOTED NONE PRSNT: CPT | Performed by: NURSE PRACTITIONER

## 2024-02-23 PROCEDURE — 1159F MED LIST DOCD IN RCRD: CPT | Performed by: NURSE PRACTITIONER

## 2024-02-23 RX ORDER — TRIAMCINOLONE ACETONIDE 1 MG/G
OINTMENT TOPICAL
Qty: 80 G | Refills: 1 | Status: SHIPPED | OUTPATIENT
Start: 2024-02-23

## 2024-02-23 RX ORDER — METOPROLOL TARTRATE 25 MG/1
25 TABLET, FILM COATED ORAL 2 TIMES DAILY
Qty: 90 TABLET | Refills: 2 | Status: SHIPPED | OUTPATIENT
Start: 2024-02-23

## 2024-02-23 RX ORDER — LEVOTHYROXINE SODIUM 137 UG/1
137 TABLET ORAL DAILY
Qty: 90 TABLET | Refills: 0 | Status: SHIPPED | OUTPATIENT
Start: 2024-02-23 | End: 2024-04-03 | Stop reason: SDUPTHER

## 2024-02-23 RX ORDER — TOPIRAMATE 50 MG/1
50 TABLET, FILM COATED ORAL 2 TIMES DAILY
Qty: 90 TABLET | Refills: 2 | Status: SHIPPED | OUTPATIENT
Start: 2024-02-23

## 2024-02-23 RX ORDER — GABAPENTIN 100 MG/1
CAPSULE ORAL
Qty: 90 CAPSULE | Refills: 5 | Status: SHIPPED | OUTPATIENT
Start: 2024-02-23 | End: 2024-04-16

## 2024-02-23 RX ORDER — LISINOPRIL AND HYDROCHLOROTHIAZIDE 20; 25 MG/1; MG/1
1 TABLET ORAL DAILY
Qty: 90 TABLET | Refills: 3 | Status: SHIPPED | OUTPATIENT
Start: 2024-02-23

## 2024-02-23 RX ORDER — ERGOCALCIFEROL 1.25 MG/1
1.25 CAPSULE ORAL
Qty: 6 CAPSULE | Refills: 2 | Status: SHIPPED | OUTPATIENT
Start: 2024-02-23

## 2024-02-23 RX ORDER — FLUTICASONE PROPIONATE 50 MCG
2 SPRAY, SUSPENSION (ML) NASAL DAILY
Qty: 16 G | Refills: 3 | Status: SHIPPED | OUTPATIENT
Start: 2024-02-23

## 2024-02-23 RX ORDER — AMLODIPINE BESYLATE 10 MG/1
10 TABLET ORAL DAILY
Qty: 90 TABLET | Refills: 3 | Status: SHIPPED | OUTPATIENT
Start: 2024-02-23 | End: 2024-04-03 | Stop reason: SDUPTHER

## 2024-02-23 NOTE — PROGRESS NOTES
Subjective   Patient ID: Barbara Schultz is a 68 y.o. female who presents for No chief complaint on file..    HPI feet swelll and goes down when she elevates the legs   She joined [employer] and will work out , she has obesity and wants to lose weight   She is doing well 'her blood pressure has been controlled   Her last aic level was 5.7 , uric acid was elevated   She is taking duloxetine for depression   She has alopecia and needs triamcinolone ointment for her scalp  Review of Systems Negative except what was mentioned in the HPI       Objective   There were no vitals taken for this visit.    Physical Exam  Vitals and nursing note reviewed.   Constitutional:       Appearance: Normal appearance.   HENT:      Head: Normocephalic.      Nose: Nose normal.   Eyes:      Conjunctiva/sclera: Conjunctivae normal.      Pupils: Pupils are equal, round, and reactive to light.   Pulmonary:      Effort: Pulmonary effort is normal.   Skin:     General: Skin is dry.   Neurological:      Mental Status: She is alert and oriented to person, place, and time. Mental status is at baseline.   Psychiatric:         Mood and Affect: Mood normal.         Behavior: Behavior normal.         Thought Content: Thought content normal.         Judgment: Judgment normal.         Assessment/Plan   Problem List Items Addressed This Visit             ICD-10-CM    Essential hypertension I10    Relevant Medications    metoprolol tartrate (Lopressor) 25 mg tablet    amLODIPine (Norvasc) 10 mg tablet    Hyperlipidemia E78.5    Relevant Medications    lisinopriL-hydrochlorothiazide 20-25 mg tablet    Hypothyroidism E03.9    Relevant Medications    levothyroxine (Synthroid, Levoxyl) 137 mcg tablet    Vitamin D deficiency E55.9    Relevant Medications    ergocalciferol (Vitamin D-2) 1.25 MG (24580 UT) capsule     Other Visit Diagnoses         Codes    Androgenic alopecia    -  Primary L64.9    Relevant Medications    triamcinolone (Kenalog) 0.1 %  ointment    Left-sided low back pain with left-sided sciatica, unspecified chronicity     M54.42    Relevant Medications    gabapentin (Neurontin) 100 mg capsule    Nonintractable headache, unspecified chronicity pattern, unspecified headache type     R51.9    Relevant Medications    topiramate (Topamax) 50 mg tablet    Allergic rhinitis, unspecified seasonality, unspecified trigger     J30.9    Relevant Medications    fluticasone (Flonase) 50 mcg/actuation nasal spray

## 2024-04-03 ENCOUNTER — OFFICE VISIT (OUTPATIENT)
Dept: PRIMARY CARE | Facility: CLINIC | Age: 69
End: 2024-04-03
Payer: COMMERCIAL

## 2024-04-03 VITALS
BODY MASS INDEX: 50.02 KG/M2 | HEART RATE: 53 BPM | WEIGHT: 293 LBS | TEMPERATURE: 97.9 F | SYSTOLIC BLOOD PRESSURE: 140 MMHG | HEIGHT: 64 IN | DIASTOLIC BLOOD PRESSURE: 70 MMHG | RESPIRATION RATE: 14 BRPM | OXYGEN SATURATION: 97 %

## 2024-04-03 DIAGNOSIS — G83.10 PARESIS OF SINGLE LOWER EXTREMITY (MULTI): ICD-10-CM

## 2024-04-03 DIAGNOSIS — I10 ESSENTIAL HYPERTENSION: ICD-10-CM

## 2024-04-03 DIAGNOSIS — M17.12 OSTEOARTHRITIS OF LEFT KNEE, UNSPECIFIED OSTEOARTHRITIS TYPE: ICD-10-CM

## 2024-04-03 DIAGNOSIS — L65.9 ALOPECIA: Primary | ICD-10-CM

## 2024-04-03 DIAGNOSIS — R05.9 COUGH, UNSPECIFIED TYPE: ICD-10-CM

## 2024-04-03 DIAGNOSIS — N18.31 STAGE 3A CHRONIC KIDNEY DISEASE (MULTI): ICD-10-CM

## 2024-04-03 DIAGNOSIS — E66.01 OBESITY, MORBID, BMI 50 OR HIGHER (MULTI): ICD-10-CM

## 2024-04-03 DIAGNOSIS — F33.9 DEPRESSION, RECURRENT (CMS-HCC): ICD-10-CM

## 2024-04-03 DIAGNOSIS — M25.552 HIP PAIN, LEFT: ICD-10-CM

## 2024-04-03 DIAGNOSIS — E03.9 HYPOTHYROIDISM, UNSPECIFIED TYPE: ICD-10-CM

## 2024-04-03 DIAGNOSIS — M17.11 OSTEOARTHRITIS OF RIGHT KNEE, UNSPECIFIED OSTEOARTHRITIS TYPE: ICD-10-CM

## 2024-04-03 PROCEDURE — 3008F BODY MASS INDEX DOCD: CPT | Performed by: NURSE PRACTITIONER

## 2024-04-03 PROCEDURE — 1159F MED LIST DOCD IN RCRD: CPT | Performed by: NURSE PRACTITIONER

## 2024-04-03 PROCEDURE — 3078F DIAST BP <80 MM HG: CPT | Performed by: NURSE PRACTITIONER

## 2024-04-03 PROCEDURE — 99214 OFFICE O/P EST MOD 30 MIN: CPT | Performed by: NURSE PRACTITIONER

## 2024-04-03 PROCEDURE — 3077F SYST BP >= 140 MM HG: CPT | Performed by: NURSE PRACTITIONER

## 2024-04-03 RX ORDER — PROMETHAZINE HYDROCHLORIDE 6.25 MG/5ML
12.5 SYRUP ORAL EVERY 8 HOURS PRN
Qty: 120 ML | Refills: 0 | Status: SHIPPED | OUTPATIENT
Start: 2024-04-03 | End: 2024-04-16 | Stop reason: WASHOUT

## 2024-04-03 RX ORDER — LISINOPRIL 20 MG/1
20 TABLET ORAL DAILY
Qty: 100 TABLET | Refills: 1 | Status: SHIPPED | OUTPATIENT
Start: 2024-04-03 | End: 2025-05-08

## 2024-04-03 RX ORDER — ACETAMINOPHEN 500 MG
TABLET ORAL
Qty: 30 TABLET | Refills: 1 | Status: SHIPPED | OUTPATIENT
Start: 2024-04-03

## 2024-04-03 RX ORDER — AMLODIPINE BESYLATE 10 MG/1
10 TABLET ORAL DAILY
Qty: 90 TABLET | Refills: 1 | Status: SHIPPED | OUTPATIENT
Start: 2024-04-03

## 2024-04-03 RX ORDER — LEVOTHYROXINE SODIUM 137 UG/1
137 TABLET ORAL DAILY
Qty: 90 TABLET | Refills: 0 | Status: SHIPPED | OUTPATIENT
Start: 2024-04-03 | End: 2024-07-02

## 2024-04-03 RX ORDER — KETOCONAZOLE 20 MG/ML
SHAMPOO, SUSPENSION TOPICAL
Qty: 120 ML | Refills: 2 | Status: SHIPPED | OUTPATIENT
Start: 2024-04-03

## 2024-04-03 RX ORDER — DICLOFENAC SODIUM 10 MG/G
4 GEL TOPICAL 4 TIMES DAILY
Qty: 100 G | Refills: 1 | Status: SHIPPED | OUTPATIENT
Start: 2024-04-03

## 2024-04-03 ASSESSMENT — PATIENT HEALTH QUESTIONNAIRE - PHQ9
1. LITTLE INTEREST OR PLEASURE IN DOING THINGS: NOT AT ALL
2. FEELING DOWN, DEPRESSED OR HOPELESS: NOT AT ALL
SUM OF ALL RESPONSES TO PHQ9 QUESTIONS 1 AND 2: 0

## 2024-04-03 NOTE — PROGRESS NOTES
"Subjective   Patient ID: Barbara Schultz is a 68 y.o. female who presents for Follow-up (Follow up- meds prescribed has questions).    HPI The patient with sleep apnea , obesity , hld , hypothyroidism , chronic low back pain with leg weakness, ckd  and hypertension presents for a follow up   The patient takes two pills metoprolol and amlodipine and lisinopril hcts for her blood pressure   She has a hard tome going up and down steps   She has had right knee pain recently   She had a mammogram done here in [facility]  She has difficulty walking   She does not have vertigo   Her knee have been sore for a long time   She denies depression  Her blood pressure is elevated   Discussed weight loss measures , she is interested in the bariatric surgery program   I discussed referral to advanced care pharmacy due to her ckd   Review of Systems       Objective   /80 (Patient Position: Sitting)   Pulse 53   Temp 36.6 °C (97.9 °F)   Resp 14   Ht 1.626 m (5' 4\")   Wt 136 kg (298 lb 12.8 oz)   SpO2 97%   BMI 51.29 kg/m²     Physical Exam  Vitals and nursing note reviewed.   Constitutional:       Appearance: Normal appearance. She is obese.   HENT:      Head: Normocephalic and atraumatic.      Right Ear: Tympanic membrane normal.      Left Ear: Tympanic membrane normal.      Nose: Nose normal.      Mouth/Throat:      Mouth: Mucous membranes are moist.   Eyes:      Extraocular Movements: Extraocular movements intact.      Conjunctiva/sclera: Conjunctivae normal.   Cardiovascular:      Rate and Rhythm: Normal rate and regular rhythm.      Pulses: Normal pulses.      Heart sounds: Normal heart sounds.   Pulmonary:      Effort: Pulmonary effort is normal.      Breath sounds: Normal breath sounds.   Abdominal:      General: Abdomen is flat. Bowel sounds are normal.      Palpations: Abdomen is soft.   Musculoskeletal:         General: Normal range of motion.      Cervical back: Normal range of motion and neck supple.      " Comments: Knee pain with rom    Skin:     General: Skin is warm and dry.   Neurological:      General: No focal deficit present.      Mental Status: She is alert and oriented to person, place, and time. Mental status is at baseline.   Psychiatric:         Mood and Affect: Mood normal.         Behavior: Behavior normal.         Thought Content: Thought content normal.         Judgment: Judgment normal.         Assessment/Plan   Problem List Items Addressed This Visit             ICD-10-CM    Cough R05.9    Relevant Medications    promethazine (Phenergan) 6.25 mg/5 mL syrup    Other Relevant Orders    Referral to Nutrition Services    Essential hypertension I10    Relevant Medications    amLODIPine (Norvasc) 10 mg tablet    lisinopril 20 mg tablet    Other Relevant Orders    Referral to Nutrition Services    Hip pain, left M25.552    Relevant Medications    acetaminophen (Tylenol) 500 mg tablet    Hypothyroidism E03.9    Relevant Medications    levothyroxine (Synthroid, Levoxyl) 137 mcg tablet    Obesity, morbid, BMI 50 or higher (CMS/[facility]) E66.01    Relevant Orders    Referral to Bariatric Surgery    Stage 3a chronic kidney disease (CMS/[facility]) N18.31    Relevant Orders    Follow Up In Advanced Primary Care - Pharmacy    Paresis of single lower extremity (CMS/HCC) G83.10    Depression, recurrent (CMS/HCC) F33.9     Other Visit Diagnoses         Codes    Alopecia    -  Primary L65.9    Relevant Medications    ketoconazole (NIZOral) 2 % shampoo    Osteoarthritis of right knee, unspecified osteoarthritis type     M17.11    Osteoarthritis of left knee, unspecified osteoarthritis type     M17.12    Relevant Medications    diclofenac sodium (Voltaren) 1 % gel

## 2024-04-03 NOTE — PATIENT INSTRUCTIONS
The gabapentin can help numbness and tingling  You can have things affected from what happens in life   You can use the diclofneac gel for the knee pain or anythings and it affectsthe joints of the body   Take one medication lisinopril 20 mg with the other medication for blood pressure

## 2024-04-05 ENCOUNTER — TELEPHONE (OUTPATIENT)
Dept: SURGERY | Facility: CLINIC | Age: 69
End: 2024-04-05
Payer: COMMERCIAL

## 2024-04-15 ENCOUNTER — TELEMEDICINE (OUTPATIENT)
Dept: PHARMACY | Facility: HOSPITAL | Age: 69
End: 2024-04-15
Payer: COMMERCIAL

## 2024-04-15 DIAGNOSIS — N18.31 STAGE 3A CHRONIC KIDNEY DISEASE (MULTI): ICD-10-CM

## 2024-04-15 RX ORDER — ACETAMINOPHEN 500 MG
1 TABLET ORAL DAILY
COMMUNITY

## 2024-04-15 NOTE — PROGRESS NOTES
Subjective   Patient ID: Barbara Schultz is a 69 y.o. female who presents for initial appointment for CKD.    Referring Provider: Rhonda Bhat APRN*    Patient reports that she has been experiencing pain in her toe - states she had pain in her knee (resolved) but now is experiencing toe pain and swelling. Denies redness or warmth in the feet/legs or shortness of breath. States it's been harder for her to move around/walk with the pain, wants to visit urgent care. States her mother had gout. Denies taking duloxetine or gabapentin.     Objective     There were no vitals taken for this visit.     LAB  Lab Results   Component Value Date    BILITOT 0.4 06/20/2023    CALCIUM 10.4 06/20/2023    CO2 26 06/20/2023     06/20/2023    CREATININE 1.09 (H) 06/20/2023    GLUCOSE 95 06/20/2023    ALKPHOS 71 06/20/2023    K 3.6 06/20/2023    PROT 8.0 06/20/2023     06/20/2023    AST 12 06/20/2023    ALT 10 06/20/2023    BUN 18 06/20/2023    ANIONGAP 14 06/20/2023    ALBUMIN 4.1 06/20/2023    GFRF 56 (A) 06/20/2023     Lab Results   Component Value Date    CHOL 180 10/16/2019    HDL 55.3 10/16/2019     Lab Results   Component Value Date    HGBA1C 5.7 (A) 06/20/2023     The ASCVD Risk score (Pascual GREENBERG, et al., 2019) failed to calculate for the following reasons:    Cannot find a previous HDL lab    Cannot find a previous total cholesterol lab    CKD ASSESSMENT    Renal Function  CKD Stage: 3  eGFR: 56 mL/min/1.73 m2 as of 6/20/2023 - need repeat  sCr: 1.09 mg/dL as of 6/20/2023 - need repeat    Hypertension  Per chart review of vitals:  Systolic Diastolic Date   140 70 4/3/2024   140 80 4/3/2024   120 70 9/8/2023     Medications:   Lisinopril 20 mg- hydrochlorothiazide 25 mg once daily  Lisinopril 20 mg -  clarified patient is taking in addition to lisinopril/hydrochlorothiazide per Cece Bhat for 6 months only  Amlodipine 10 mg once daily  Metoprolol tartrate 25 mg twice daily    Hyperlipidemia  LDL: 109 as of  6/20/2023 - need lipid panel  On statin? No    Assessment/Plan   Problem List Items Addressed This Visit       Stage 3a chronic kidney disease (Multi)   CKD currently stable, needs updated urine albumin-creatinine ratio, fasting lipid panel, and complete metabolic panel (kidney function/electrolytes). May consider adding SGLT-2 inhibitor at follow-up visit (Farxiga ~$40/month per insurance). Will confirm lisinopril and lisinopril-hydrochlorothiazide with PCP (may consider stopping combination pill, increasing to lisinopril 40, and adding chlorthalidone to prevent therapy duplication). Patient interested in monitoring BP at home.  -Visit the lab to check fasting lipid panel, urine albumin-creatinine ratio, and complete metabolic panel (will order).  -Will send BP cuff to [facility]. Recommended patient check BP 2-3 times per week ~1 hour after taking morning BP meds and record a log.     Recommended patient follow up with urgent care/doctor regarding her foot pain.     Follow-up: 4 weeks, 5/13/2024 2:30 pm    Verna Ingram, PharmD Candidate 2024   Supervised by Trudy RuizD [facility]  Clinical Pharmacist Specialist, Primary Care    Continue all meds under the continuation of care with the referring provider and clinical pharmacy team

## 2024-04-16 RX ORDER — ACETAMINOPHEN 500 MG
TABLET ORAL
Qty: 1 KIT | Refills: 0 | Status: SHIPPED | OUTPATIENT
Start: 2024-04-16 | End: 2024-04-25 | Stop reason: SDUPTHER

## 2024-04-25 DIAGNOSIS — N18.31 STAGE 3A CHRONIC KIDNEY DISEASE (MULTI): ICD-10-CM

## 2024-04-25 RX ORDER — ACETAMINOPHEN 500 MG
TABLET ORAL
Qty: 1 KIT | Refills: 0 | Status: SHIPPED | OUTPATIENT
Start: 2024-04-25

## 2024-05-03 NOTE — PROGRESS NOTES
"Surgeon: Lela (7/2/24)  Patient is considering:  undecided      ASSESSMENT:  Current weight:  298 lbs   Ht: 64  in   BMI: 51.29       Initial start weight: 298 lbs  Pre-Op Excess Body Weight (EBW):   153 lbs  Target Post-Op weight goal:  GB: 176 - 206 lbs;  - 222 lbs    Food allergies/intolerances:  NKFA  Chewing/Swallowing/Dentition:  wears top dentures  Nausea / Vomiting / Hx Gastroparesis:  denies  Diarrhea/ Constipation: denies  Exercise level:  sedentary   Smoking/Tobacco use: denies  Vitamins/Minerals supplements:  vitamin D  Medications:   see list  Past diet attempts:   Weight Watchers   Hours of sleep/night: 5-6, \"TV person\", takes naps during the day     24 HOUR RECALL/DIET HISTORY:  Breakfast:  9:00 AM honey nut cheerios w/ 2% milk   Snack:    Lunch: seldom eats or goes out and grabs sandwich  Snack:   Dinner:  Chicken fried chicken x3 piece, medina slaw   Snack: oatmeal raisin cookie   Beverages: Ginger ale 3 cans/week, 5-6 16oz water bottles, orange juice w/ meds   Alcohol: rare     Person responsible for cooking & shopping? Self     Grocery stores frequented: [employer]   [employer] trips per week/month: 1-2 times/month   Food Insecurity: Low  How often do you eat sweet snacks?  Yes, few times/week  How often do you eat savory snacks?  Yes, few times/week, buys low sodium/salt free  How often do you eat out?  Daily   Do you feel overly stuffed?  Tries not to   Binge Eating? Denies  Night Eating?  Denies  Emotional Eating?  Denies       READINESS TO LEARN:  Motivation to learn: Interested        Understanding of instruction: Good    Anticipated Compliance: Good         Family Support: Unable to assess-family not present          Educational Materials Provided:    Plate Method  High Protein Snack List  High Protein Drink  High Protein food list  Goals sheet    Patient will scheduled to attend a Virtual Education Class to review the 2 week Pre-op diet, Post op fluid, protein, vitamin/mineral " "supplementation, exercise goals and post op diet progression.    Patient presents with excessive calorie obesity seeking  weight loss surgery.    Patient seen today to complete nutrition evaluation for weight loss surgery. Patient reports interested in WLS as a way to improve her QOL and overall health. She's concerned with her HTN and wants it better managed. Patient reports she has cooked for many people since she was 14 and now is at a point where she is \"burnt out\" on cooking and relies heavily on dining out or fast food. Patient reports she eats 2 times/day, some snacking. Patient is up late watching TV, but seems to get up at a reasonable time. Denies snacking in the evening. Patient reports minimal activity, had a rolled ankle about a month ago and still recovering. Patient reports she drinks mainly water, OJ with meds and pop on occasion.     Recommended to begin to eat 3 meals/day, avoid skipping meals. Reviewed many ideas to help eat more at home by purchasing foods that can be quickly assembled into meals (canned proteins, rotisserie chicken, HB eggs, yogurts, etc).  Encouraged to eat protein rich foods at each meal, balanced with fiber rich foods. Reviewed fluids to eliminate and replace with SF, non-carbonated, caffeine free fluids. Reviewed postop behaviors and encouraged to begin practicing. Recommended to start daily MVI. Recommended to begin exercising  for 3 days/week for 10-15 minutes.     Patient was receptive to nutritional recommendations, asked numerous questions, and verbalized understanding of the weight loss surgery diet.  Patient expressed understanding about the importance of strict dietary compliance post-surgery to avoid nutritional deficiencies and achieve optimal weight loss and verbalized intent to follow dietary recommendations.      Malnutrition Screening:  Significant unintentional weight loss? No  Eating less than 75% of usual intake for more than 2 weeks? No      Nutrition " Diagnosis:   1. Overweight/obesity related to excess energy intake as evidenced by BMI = 40 kg/m^2.  2. Food- and nutrition-related knowledge deficit related to lack of prior exposure to surgical weight loss information as evidenced by pt new to surgical program.    Nutrition Interventions:   1. Modify type and amount of food and nutrients within meals and snacks.  2. Comprehensive Nutrition Education    Recommendations:  1. Structure meal patterns, eating three meals and 1-2 snacks per day.   2. Build meals around protein rich foods. Aim for 3-4 ounces (20-30 grams) protein per meal. Lean proteins include chicken, turkey, fish, lean cuts of beef and 90% lean ground beef, pork, shrimp, low fat dairy products, and eggs.   3. Fill half your plate with non-starchy vegetables. Select high fiber starches like  sweet potatoes, peas, beans, lentils, quinoa, whole wheat breads and pastas, and brown rice. Keep portion of starches to 1/4 plate (1/2 cup - 1 cup per meal).  4. Avoid pop and juices - or switch to a lower sugar option. Continue to drink 5-6 16oz water bottles daily.   5. Practice no drinking with meals and take small sips of fluid in between meals. Try not to chug or gulp liquids.   6. Limit dining out to NO MORE THAN 4 TIMES/WEEK  7. Begin daily multivitamin.  [employer] Adult Complete has everything you need.  8. Try chair exercises - see list provided - and start with 10-15 minutes 3-5 days/week. Gradually build on more frequency and duration as you can tolerate. It is recommended to aim for 250 minutes/week of exercise to promote weight loss.   9. We will follow up with your progress on June 13th at 10:00 AM. You will need to provide a 24 hour food recall.    Pre-op Goal weight: lose 5% of body weight    Nutrition Monitoring and Evaluation: 1-2 pound weight loss per week  Criteria: weight check  Need for Follow-up:     Patient does meet National [employer] Health guidelines for weight loss surgery, however needs  to demonstrate consistent effort in making dietary changes before giving clearance. It is anticipated that the patient will need at least   1-2  nutritional follow-up visits prior to clearance for surgery.

## 2024-05-09 ENCOUNTER — NUTRITION (OUTPATIENT)
Dept: SURGERY | Facility: CLINIC | Age: 69
End: 2024-05-09
Payer: COMMERCIAL

## 2024-05-09 DIAGNOSIS — Z98.84 BARIATRIC SURGERY STATUS: Primary | ICD-10-CM

## 2024-05-13 ENCOUNTER — APPOINTMENT (OUTPATIENT)
Dept: PHARMACY | Facility: HOSPITAL | Age: 69
End: 2024-05-13
Payer: COMMERCIAL

## 2024-06-06 ENCOUNTER — TELEMEDICINE (OUTPATIENT)
Dept: PHARMACY | Facility: HOSPITAL | Age: 69
End: 2024-06-06
Payer: COMMERCIAL

## 2024-06-06 DIAGNOSIS — N18.31 STAGE 3A CHRONIC KIDNEY DISEASE (MULTI): ICD-10-CM

## 2024-06-06 NOTE — ASSESSMENT & PLAN NOTE
Patient has CKD stage 3a. Her most recent eGFR from 6/20/2023 was 56 mL/min. Patient needs to get updated labs done. She is planning to go and do that on Saturday of this week, or possibly the next week. Patient wants to wait to talk about SGLT-2i treatment until after she gets her labwork done and the information on her renal function is up to date.    Patient did confirm that she is currently taking both lisinopril and combination lisinopril-hydrochlorothiazide.   PLAN:  Get updated labs done.    Continue   Lisinopril 20 mg; take 1 tablet by mouth daily  Lisinopril-hydrochlorothiazide 20-25 mg; take 1 tablet by mouth daily

## 2024-06-06 NOTE — PROGRESS NOTES
Subjective     Patient ID: Barbara Schultz is a 69 y.o. female who presents for Chronic Kidney Disease.    Referring Provider: Rhonda Bhat APRN*       No Known Allergies    Objective   Lab Review  Lab Results   Component Value Date    BILITOT 0.4 06/20/2023    CALCIUM 10.4 06/20/2023    CO2 26 06/20/2023     06/20/2023    CREATININE 1.09 (H) 06/20/2023    GLUCOSE 95 06/20/2023    ALKPHOS 71 06/20/2023    K 3.6 06/20/2023    PROT 8.0 06/20/2023     06/20/2023    AST 12 06/20/2023    ALT 10 06/20/2023    BUN 18 06/20/2023    ANIONGAP 14 06/20/2023    ALBUMIN 4.1 06/20/2023    GFRF 56 (A) 06/20/2023     Lab Results   Component Value Date    CHOL 180 10/16/2019    HDL 55.3 10/16/2019     Lab Results   Component Value Date    HGBA1C 5.7 (A) 06/20/2023     The ASCVD Risk score (Pascual GREENBERG, et al., 2019) failed to calculate for the following reasons:    Cannot find a previous HDL lab    Cannot find a previous total cholesterol lab    CKD ASSESSMENT    Renal Function  CKD Stage: 3a  eGFR: 56 mL/min/1.73 m2 as of 6/20/2023  sCr: 1.09 mg/dL as of 6/20/2023    Hypertension  Per chart review of vitals:  Systolic Diastolic Date   140 70 4/3/2024   140 80 4/3/2024     Medications:   Amlodipine 10 mg; take 1 tablet by mouth daily  Metoprolol tartrate 25 mg; take 1 tablet by mouth twice daily  Lisinopril-hydrochlorothiazide 20-25 mg; take 1 tablet by mouth daily  Lisinopril 20 mg; take 1 tablet by mouth daily      Medications reviewed for appropriate dosing in setting of CKD  Recommended changes: none, medications dosed appropriately      Assessment/Plan     Problem List Items Addressed This Visit       Stage 3a chronic kidney disease (Multi)     Patient has CKD stage 3a. Her most recent eGFR from 6/20/2023 was 56 mL/min. Patient needs to get updated labs done. She is planning to go and do that on Saturday of this week, or possibly the next week. Patient wants to wait to talk about SGLT-2i treatment until after she  gets her labwork done and the information on her renal function is up to date.    Patient did confirm that she is currently taking both lisinopril and combination lisinopril-hydrochlorothiazide.   PLAN:  Get updated labs done.    Continue   Lisinopril 20 mg; take 1 tablet by mouth daily  Lisinopril-hydrochlorothiazide 20-25 mg; take 1 tablet by mouth daily            Follow up: I recommend follow up be 2 weeks.    Rebecca Fierro, Pharm. D.  PGY-1 Pharmacy Resident    Continue all meds under the continuation of care with the referring provider and clinical pharmacy team

## 2024-06-19 ENCOUNTER — LAB (OUTPATIENT)
Dept: LAB | Facility: LAB | Age: 69
End: 2024-06-19
Payer: COMMERCIAL

## 2024-06-19 DIAGNOSIS — N18.31 STAGE 3A CHRONIC KIDNEY DISEASE (MULTI): ICD-10-CM

## 2024-06-19 LAB
ALBUMIN SERPL BCP-MCNC: 3.9 G/DL (ref 3.4–5)
ALP SERPL-CCNC: 65 U/L (ref 33–136)
ALT SERPL W P-5'-P-CCNC: 7 U/L (ref 7–45)
ANION GAP SERPL CALC-SCNC: 14 MMOL/L (ref 10–20)
AST SERPL W P-5'-P-CCNC: 11 U/L (ref 9–39)
BILIRUB SERPL-MCNC: 0.4 MG/DL (ref 0–1.2)
BUN SERPL-MCNC: 18 MG/DL (ref 6–23)
CALCIUM SERPL-MCNC: 10 MG/DL (ref 8.6–10.6)
CHLORIDE SERPL-SCNC: 102 MMOL/L (ref 98–107)
CHOLEST SERPL-MCNC: 194 MG/DL (ref 0–199)
CHOLESTEROL/HDL RATIO: 3.1
CO2 SERPL-SCNC: 27 MMOL/L (ref 21–32)
CREAT SERPL-MCNC: 1.21 MG/DL (ref 0.5–1.05)
CREAT UR-MCNC: 164.3 MG/DL (ref 20–320)
EGFRCR SERPLBLD CKD-EPI 2021: 49 ML/MIN/1.73M*2
GLUCOSE SERPL-MCNC: 97 MG/DL (ref 74–99)
HDLC SERPL-MCNC: 63.1 MG/DL
LDLC SERPL CALC-MCNC: 112 MG/DL
MICROALBUMIN UR-MCNC: 9 MG/L
MICROALBUMIN/CREAT UR: 5.5 UG/MG CREAT
NON HDL CHOLESTEROL: 131 MG/DL (ref 0–149)
POTASSIUM SERPL-SCNC: 4 MMOL/L (ref 3.5–5.3)
PROT SERPL-MCNC: 7.6 G/DL (ref 6.4–8.2)
SODIUM SERPL-SCNC: 139 MMOL/L (ref 136–145)
TRIGL SERPL-MCNC: 97 MG/DL (ref 0–149)
VLDL: 19 MG/DL (ref 0–40)

## 2024-06-19 PROCEDURE — 80061 LIPID PANEL: CPT

## 2024-06-19 PROCEDURE — 82043 UR ALBUMIN QUANTITATIVE: CPT

## 2024-06-19 PROCEDURE — 36415 COLL VENOUS BLD VENIPUNCTURE: CPT

## 2024-06-19 PROCEDURE — 80053 COMPREHEN METABOLIC PANEL: CPT

## 2024-06-19 PROCEDURE — 82570 ASSAY OF URINE CREATININE: CPT

## 2024-06-20 ENCOUNTER — APPOINTMENT (OUTPATIENT)
Dept: PHARMACY | Facility: HOSPITAL | Age: 69
End: 2024-06-20
Payer: COMMERCIAL

## 2024-06-20 DIAGNOSIS — N18.31 STAGE 3A CHRONIC KIDNEY DISEASE (MULTI): ICD-10-CM

## 2024-06-20 NOTE — ASSESSMENT & PLAN NOTE
Patient has stage 3a CKD. Her most recent eGFR from 6/19/2024 was 49 mL/min/1.73 m2. Counseled patient on the importance of a SGLT-2i for renal disease.   Jardiance Education:     - Counseled patient on Jardiance MOA, expectations, side effects, duration of therapy, administration, and monitoring parameters.  - Reviewed the benefits of SGLT-2i therapy, such as glycemic control and kidney and CV protection.  - Advised patient to practice proper  hygiene to reduce risk of UTIs or yeast infections.  - Advised patient to maintain adequate fluid intake to remain hydrated while on SGLT2i therapy.  - Answered all patient questions and concerns.    PLAN:  Continue  Lisinopril 20 mg; take 1 tablet by mouth daily  Lisinopril-hydrochlorothiazide 20-25 mg; take 1 tablet by mouth daily    Start  Jardiance 10 mg; take 1 tablet by mouth daily

## 2024-06-20 NOTE — PROGRESS NOTES
Subjective     Patient ID: Barbara Schultz is a 69 y.o. female who presents for Chronic Kidney Disease.    Referring Provider: Rhonda Bhat APRN*     Patient presents for a follow-up visit for CKD. She has CKD stage 3a, the most recent eGFR from 6/19/2024 was 49 mL/min/1.73 m2.       No Known Allergies    Objective     CKD ASSESSMENT    Renal Function  CKD Stage: 3a  eGFR: 49 mL/min/1.73 m2 as of 6/19/2024  sCr: 1.21 mg/dL as of 6/19/2024    Hypertension  Per chart review of vitals:  Systolic Diastolic Date   140 70 4/3/2024     Medications:   Amlodipine 10 mg; take 1 tablet by mouth daily  Metoprolol tartrate 25 mg; take 1 tablet by mouth twice a day  Lisinopril-hydrochlorothiazide 20-25 mg; take 1 tablet by mouth daily  Lisinopril 20 mg; take 1 tablet by mouth daily      Lab Review  Lab Results   Component Value Date    BILITOT 0.4 06/19/2024    CALCIUM 10.0 06/19/2024    CO2 27 06/19/2024     06/19/2024    CREATININE 1.21 (H) 06/19/2024    GLUCOSE 97 06/19/2024    ALKPHOS 65 06/19/2024    K 4.0 06/19/2024    PROT 7.6 06/19/2024     06/19/2024    AST 11 06/19/2024    ALT 7 06/19/2024    BUN 18 06/19/2024    ANIONGAP 14 06/19/2024    ALBUMIN 3.9 06/19/2024    GFRF 56 (A) 06/20/2023     Lab Results   Component Value Date    TRIG 97 06/19/2024    CHOL 194 06/19/2024    LDLCALC 112 (H) 06/19/2024    HDL 63.1 06/19/2024     Lab Results   Component Value Date    HGBA1C 5.7 (A) 06/20/2023     The 10-year ASCVD risk score (Pascual GREENBERG, et al., 2019) is: 11.9%    Values used to calculate the score:      Age: 69 years      Sex: Female      Is Non- : Yes      Diabetic: No      Tobacco smoker: No      Systolic Blood Pressure: 140 mmHg      Is BP treated: Yes      HDL Cholesterol: 63.1 mg/dL      Total Cholesterol: 194 mg/dL      Assessment/Plan     Problem List Items Addressed This Visit       Stage 3a chronic kidney disease (Multi)     Patient has stage 3a CKD. Her most recent eGFR from  6/19/2024 was 49 mL/min/1.73 m2. Counseled patient on the importance of a SGLT-2i for renal disease.   Jardiance Education:     - Counseled patient on Jardiance MOA, expectations, side effects, duration of therapy, administration, and monitoring parameters.  - Reviewed the benefits of SGLT-2i therapy, such as glycemic control and kidney and CV protection.  - Advised patient to practice proper  hygiene to reduce risk of UTIs or yeast infections.  - Advised patient to maintain adequate fluid intake to remain hydrated while on SGLT2i therapy.  - Answered all patient questions and concerns.    PLAN:  Continue  Lisinopril 20 mg; take 1 tablet by mouth daily  Lisinopril-hydrochlorothiazide 20-25 mg; take 1 tablet by mouth daily    Start  Jardiance 10 mg; take 1 tablet by mouth daily          Recommending follow-up for 5 weeks to see how she is doing with the medication.    Rebecca Fierro, Pharm. D.  PGY-1 Pharmacy Resident     Continue all meds under the continuation of care with the referring provider and clinical pharmacy team

## 2024-06-21 DIAGNOSIS — E78.5 HYPERLIPIDEMIA, UNSPECIFIED HYPERLIPIDEMIA TYPE: ICD-10-CM

## 2024-06-22 RX ORDER — LISINOPRIL AND HYDROCHLOROTHIAZIDE 20; 25 MG/1; MG/1
1 TABLET ORAL DAILY
Qty: 30 TABLET | Refills: 0 | Status: SHIPPED | OUTPATIENT
Start: 2024-06-22

## 2024-07-02 ENCOUNTER — APPOINTMENT (OUTPATIENT)
Dept: SURGERY | Facility: HOSPITAL | Age: 69
End: 2024-07-02
Payer: COMMERCIAL

## 2024-07-10 ENCOUNTER — APPOINTMENT (OUTPATIENT)
Dept: PRIMARY CARE | Facility: CLINIC | Age: 69
End: 2024-07-10
Payer: COMMERCIAL

## 2024-07-10 VITALS
WEIGHT: 293 LBS | HEIGHT: 64 IN | DIASTOLIC BLOOD PRESSURE: 74 MMHG | HEART RATE: 60 BPM | TEMPERATURE: 97.2 F | OXYGEN SATURATION: 98 % | BODY MASS INDEX: 50.02 KG/M2 | SYSTOLIC BLOOD PRESSURE: 123 MMHG | RESPIRATION RATE: 14 BRPM

## 2024-07-10 DIAGNOSIS — I89.0 LYMPHEDEMA ASSOCIATED WITH OBESITY: ICD-10-CM

## 2024-07-10 DIAGNOSIS — E78.5 HYPERLIPIDEMIA, UNSPECIFIED HYPERLIPIDEMIA TYPE: ICD-10-CM

## 2024-07-10 DIAGNOSIS — E66.01 OBESITY, MORBID, BMI 50 OR HIGHER (MULTI): ICD-10-CM

## 2024-07-10 DIAGNOSIS — Z00.00 WELCOME TO MEDICARE PREVENTIVE VISIT: Primary | ICD-10-CM

## 2024-07-10 DIAGNOSIS — G47.9: ICD-10-CM

## 2024-07-10 DIAGNOSIS — E66.9 LYMPHEDEMA ASSOCIATED WITH OBESITY: ICD-10-CM

## 2024-07-10 DIAGNOSIS — Z23 NEED FOR VACCINATION WITH 20-POLYVALENT PNEUMOCOCCAL CONJUGATE VACCINE: ICD-10-CM

## 2024-07-10 DIAGNOSIS — I10 ESSENTIAL HYPERTENSION: ICD-10-CM

## 2024-07-10 DIAGNOSIS — Z01.419 WELL WOMAN EXAM: ICD-10-CM

## 2024-07-10 DIAGNOSIS — N18.31 STAGE 3A CHRONIC KIDNEY DISEASE (MULTI): ICD-10-CM

## 2024-07-10 PROCEDURE — 1036F TOBACCO NON-USER: CPT | Performed by: NURSE PRACTITIONER

## 2024-07-10 PROCEDURE — 3008F BODY MASS INDEX DOCD: CPT | Performed by: NURSE PRACTITIONER

## 2024-07-10 PROCEDURE — 1123F ACP DISCUSS/DSCN MKR DOCD: CPT | Performed by: NURSE PRACTITIONER

## 2024-07-10 PROCEDURE — 3078F DIAST BP <80 MM HG: CPT | Performed by: NURSE PRACTITIONER

## 2024-07-10 PROCEDURE — 99497 ADVNCD CARE PLAN 30 MIN: CPT | Performed by: NURSE PRACTITIONER

## 2024-07-10 PROCEDURE — G0446 INTENS BEHAVE THER CARDIO DX: HCPCS | Performed by: NURSE PRACTITIONER

## 2024-07-10 PROCEDURE — G0447 BEHAVIOR COUNSEL OBESITY 15M: HCPCS | Performed by: NURSE PRACTITIONER

## 2024-07-10 PROCEDURE — 1170F FXNL STATUS ASSESSED: CPT | Performed by: NURSE PRACTITIONER

## 2024-07-10 PROCEDURE — G0009 ADMIN PNEUMOCOCCAL VACCINE: HCPCS | Performed by: NURSE PRACTITIONER

## 2024-07-10 PROCEDURE — 99214 OFFICE O/P EST MOD 30 MIN: CPT | Performed by: NURSE PRACTITIONER

## 2024-07-10 PROCEDURE — 3074F SYST BP LT 130 MM HG: CPT | Performed by: NURSE PRACTITIONER

## 2024-07-10 PROCEDURE — G0402 INITIAL PREVENTIVE EXAM: HCPCS | Performed by: NURSE PRACTITIONER

## 2024-07-10 PROCEDURE — 1159F MED LIST DOCD IN RCRD: CPT | Performed by: NURSE PRACTITIONER

## 2024-07-10 PROCEDURE — 1160F RVW MEDS BY RX/DR IN RCRD: CPT | Performed by: NURSE PRACTITIONER

## 2024-07-10 PROCEDURE — 90677 PCV20 VACCINE IM: CPT | Performed by: NURSE PRACTITIONER

## 2024-07-10 PROCEDURE — 1158F ADVNC CARE PLAN TLK DOCD: CPT | Performed by: NURSE PRACTITIONER

## 2024-07-10 RX ORDER — ACETAMINOPHEN 500 MG
TABLET ORAL
Qty: 1 KIT | Refills: 0 | Status: SHIPPED | OUTPATIENT
Start: 2024-07-10

## 2024-07-10 RX ORDER — LISINOPRIL AND HYDROCHLOROTHIAZIDE 20; 25 MG/1; MG/1
1 TABLET ORAL DAILY
Qty: 90 TABLET | Refills: 1 | Status: SHIPPED | OUTPATIENT
Start: 2024-07-10

## 2024-07-10 ASSESSMENT — PATIENT HEALTH QUESTIONNAIRE - PHQ9
2. FEELING DOWN, DEPRESSED OR HOPELESS: NOT AT ALL
SUM OF ALL RESPONSES TO PHQ9 QUESTIONS 1 AND 2: 0
1. LITTLE INTEREST OR PLEASURE IN DOING THINGS: NOT AT ALL

## 2024-07-10 ASSESSMENT — ACTIVITIES OF DAILY LIVING (ADL)
BATHING: INDEPENDENT
DOING_HOUSEWORK: INDEPENDENT
TAKING_MEDICATION: INDEPENDENT
GROCERY_SHOPPING: INDEPENDENT
MANAGING_FINANCES: INDEPENDENT
DRESSING: INDEPENDENT

## 2024-07-10 NOTE — PROGRESS NOTES
"Subjective   Reason for Visit: Barbara Schultz is an 69 y.o. female here for a Medicare Wellness visit.   She got medicare last  November so this is a welcome to medicare   She had dahlia wade as a doctor    She tripped and feel getting up out of the basement steps and hurt her knee   She tripped going to a car last   She eats fast food and it is easier and quicker   She used to cook all the time , she tries to get salads   She can join [facility] which is close to her   She has no problems  She watches tv all night to 12 to 1 to 2 m 630    She then sleeps   She wakes up during the night    She has been sleeping since she was in her 52  Reviewed all medications by prescribing practitioner or clinical pharmacist (such as prescriptions, OTCs, herbal therapies and supplements) and documented in the medical record.  zI discussed the importance of poa and living will   HPI the patient with sleep apnea , positional vertigo , lower extremity edema bronchitis , chronic low back pain , htn, difficulty walking , hld,glaucoma , hypothyroidism ,obesity with bmi of 50 , pain in both knees , prediabetes, ckd state 3a, , tracey , glaucoma and depression presents for her first welcome to medicare visit .   She knows that a pap is generally not done after the age of 65 but still wants one done for her   She had a colonoscopy done in 2023  Her mammogram is due in October   She is due for a prevnar 20   She needs her blood pressure medication and also a blood pressure monitor to keep track of it at   She does not sleep well and wakes up all through the night   Patient Care Team:  MASON Mullins-CNP as PCP - General     Review of Systems.Negative except what was mentioned in the HPI       Objective   Vitals:  /74 (Patient Position: Sitting)   Pulse 60   Temp 36.2 °C (97.2 °F)   Resp 14   Ht 1.626 m (5' 4\")   Wt 136 kg (300 lb 9.6 oz)   SpO2 98%   BMI 51.60 kg/m²       Physical Exam    Assessment/Plan   Problem " List Items Addressed This Visit       Hyperlipidemia    Stage 3a chronic kidney disease (Multi)

## 2024-07-10 NOTE — PATIENT INSTRUCTIONS
Because you have dual you can call the insurance and see if they will cover the shingles shots  Ask about getting the rsv vaccine it is like the flu and affects children more but can affect adults it is new and we don't   You have  dahlia wade as her pcp have that taken off and me put on instead   You got a tetanus shot and a shingles vaccine but someone did not schedule you for another shingles vaccines   Try to get to a exercise place and get on the bicycle  You can do chair yoga to strengthen your core   At a [facility] center maybe they can have  balance exercises  [facility]   When you are tired you might trip   You can do strengthening of the legs with weights   Maybe you can carry a lunch box and carry a lunch for yourself   I WILL look up the sleep study and if you had sleep apnea you may need another sleep study since it was a while ago   If it was borderline it may be good it again  Follow up in 3 months

## 2024-07-18 ENCOUNTER — TELEPHONE (OUTPATIENT)
Dept: PRIMARY CARE | Facility: CLINIC | Age: 69
End: 2024-07-18

## 2024-07-19 DIAGNOSIS — Z78.0 ASYMPTOMATIC MENOPAUSAL STATE: Primary | ICD-10-CM

## 2024-07-22 ASSESSMENT — ACTIVITIES OF DAILY LIVING (ADL)
GROCERY_SHOPPING: INDEPENDENT
DRESSING: INDEPENDENT
BATHING: INDEPENDENT
MANAGING_FINANCES: INDEPENDENT
DOING_HOUSEWORK: INDEPENDENT
TAKING_MEDICATION: INDEPENDENT
MANAGING_FINANCES: INDEPENDENT
GROCERY_SHOPPING: INDEPENDENT
TAKING_MEDICATION: INDEPENDENT

## 2024-07-22 ASSESSMENT — PATIENT HEALTH QUESTIONNAIRE - PHQ9
SUM OF ALL RESPONSES TO PHQ9 QUESTIONS 1 AND 2: 0
2. FEELING DOWN, DEPRESSED OR HOPELESS: NOT AT ALL
1. LITTLE INTEREST OR PLEASURE IN DOING THINGS: NOT AT ALL

## 2024-07-23 NOTE — PROGRESS NOTES
Subjective   Reason for Visit: Barbara Schultz is an 69 y.o. female here for a Medicare Wellness visit.     Past Medical, Surgical, and Family History reviewed and updated in chart.    Reviewed all medications by prescribing practitioner or clinical pharmacist (such as prescriptions, OTCs, herbal therapies and supplements) and documented in the medical record.    HPI    Reason for Visit: Barbara Schultz is an 69 y.o. female here for a Medicare Wellness visit.   She got medicare last  November so this is a welcome to medicare   She had dahlia wade as a doctor    She tripped and feel getting up out of the basement steps and hurt her knee   She tripped going to a car last   She eats fast food and it is easier and quicker   She used to cook all the time , she tries to get salads   She can join [employer] which is close to her   She has no problems  She watches tv all night to 12 to 1 to 2 m 630    She then sleeps   She wakes up during the night    She has been sleeping since she was in her 52  Reviewed all medications by prescribing practitioner or clinical pharmacist (such as prescriptions, OTCs, herbal therapies and supplements) and documented in the medical record.  zI discussed the importance of poa and living will   HPI the patient with sleep apnea , positional vertigo , lower extremity edema bronchitis , chronic low back pain , htn, difficulty walking , hld,glaucoma , hypothyroidism ,obesity with bmi of 50 , pain in both knees , prediabetes, ckd state 3a, , tracey , glaucoma and depression presents for her first welcome to medicare visit .   She knows that a pap is generally not done after the age of 65 but still wants one done for her   She had a colonoscopy done in 2023  Her mammogram is due in October   She is due for a prevnar 20   She needs her blood pressure medication and also a blood pressure monitor to keep track of it at   She does not sleep well and wakes up all through the night   Patient Care  "Team:  MASON Mullins-CNP as PCP - General     Review of Systems.Negative except what was mentioned in the HPI         Objective   Vitals:  /74 (Patient Position: Sitting)   Pulse 60   Temp 36.2 °C (97.2 °F)   Resp 14   Ht 1.626 m (5' 4\")   Wt 136 kg (300 lb 9.6 oz)   SpO2 98%   BMI 51.60 kg/m²       Physical Exam    Assessment/Plan   Problem List Items Addressed This Visit             ICD-10-CM    Essential hypertension I10    Hyperlipidemia E78.5    Relevant Medications    lisinopriL-hydrochlorothiazide 20-25 mg tablet    Obesity, morbid, BMI 50 or higher (Multi) E66.01    Stage 3a chronic kidney disease (Multi) N18.31    Relevant Medications    blood pressure monitor kit    Other Relevant Orders    US renal complete     Other Visit Diagnoses         Codes    Welcome to Medicare preventive visit    -  Primary Z00.00    Need for vaccination with 20-polyvalent pneumococcal conjugate vaccine     Z23    Well woman exam     Z01.419    Relevant Orders    Referral to Obstetrics / Gynecology    Repeated interruption of sleep during rapid eye movement stage     G47.9    Relevant Orders    Referral to Adult Sleep Medicine    Lymphedema associated with obesity     I89.0, E66.9                 "

## 2024-07-25 ENCOUNTER — APPOINTMENT (OUTPATIENT)
Dept: PHARMACY | Facility: HOSPITAL | Age: 69
End: 2024-07-25
Payer: COMMERCIAL

## 2024-07-25 ENCOUNTER — HOSPITAL ENCOUNTER (OUTPATIENT)
Dept: RADIOLOGY | Facility: CLINIC | Age: 69
Discharge: HOME | End: 2024-07-25
Payer: COMMERCIAL

## 2024-07-25 DIAGNOSIS — N18.31 STAGE 3A CHRONIC KIDNEY DISEASE (MULTI): ICD-10-CM

## 2024-07-25 PROCEDURE — 76770 US EXAM ABDO BACK WALL COMP: CPT

## 2024-07-25 PROCEDURE — 76770 US EXAM ABDO BACK WALL COMP: CPT | Performed by: RADIOLOGY

## 2024-07-25 NOTE — PROGRESS NOTES
Clinical Pharmacy Appointment    Patient ID: Barbara Schultz is a 69 y.o. female who presents for Chronic Kidney Disease.    Pt is here for Follow Up appointment.     Referring Provider: Rhonda Bhat APRN*  PCP: MASON Mullins-CNP   Last visit with PCP: 7/10/2024   Next visit with PCP: 10/9/2024      Subjective   HPI  CHRONIC KIDNEY DISEASE ASSESSMENT  Does patient see nephrology? No    Lab Results   Component Value Date    EGFR 49 (L) 2024     Stage: 3a (eGFR 45-59)  Albuminuria: A1 (<30 mg/g)  ACE/ARB: Yes, Lisinopril    Hypertension History:  HTN diagnosis: yes  Current Regimen  Amlodipine 10 mg daily  Metoprolol tartrate 25 mg BID  Lisinopril 20 mg daily  Lisinopril-hydrochlorothiazide 20-25 mg daily  BP Cuff at home? no  HTN at goal? yes  Recent in office BP from 7/10/2024 was 123/74    Hyperlipidemia History:  Diagnosis? yes  At goal? no  Current LDL: 112 mg/dL  Current T    Diabetes History:  Diagnosis? no    Lab Review  Electrolytes: Within normal limits (2024)  Alk phos: Within normal limits (2024)  Sodium bicarb: Within normal limits (2024)  PTH: Need updated labs, last not on file  Hgb: Need updated labs, last was normal from 2023    Medication Review  Current medications and doses were reviewed and are appropriate per current kidney function.  The following medications increase risk of TITO and should be closely monitored:  Lisinopril, lisinopril-hydrochlorothiazide   (ACE, ARB, Aldosterone antag, diuretics, NSAIDS, metformin, lithium, digoxin, calcineurin inhibitors)     Medication Reconciliation:  Changed:   Added:   Discontinued:     Drug Interactions  The following drug interactions were noted: thiazide diuretics can increase serum concentrations of topiramate, may need to monitor for adverse reactions of topiramate such as hypokalemia.       Objective   No Known Allergies  Social History     Social History Narrative    Not on file      Medication  2 Review  Current Outpatient Medications   Medication Instructions    acetaminophen (Tylenol) 500 mg tablet TAKE 1 to 2 tablets every 8 to 12 as needed for arthritis    amLODIPine (NORVASC) 10 mg, oral, Daily    blood pressure monitor kit Use to test blood pressure once daily or as directed    cholecalciferol (Vitamin D3) 50 mcg (2,000 unit) capsule 1 capsule, oral, Daily    Clenpiq 10 mg-3.5 gram- 12 gram/175 mL solution     diclofenac sodium (VOLTAREN) 4 g, Topical, 4 times daily    empagliflozin (JARDIANCE) 10 mg, oral, Daily    ergocalciferol (VITAMIN D-2) 1,250 mcg, oral, Every 14 days, TAKE 1 CAPSULE twice monthly on the 15th and the 30 th for vitamin d deficiency    fluticasone (Flonase) 50 mcg/actuation nasal spray 2 sprays, Each Nostril, Daily    ketoconazole (NIZOral) 2 % shampoo Appy  to the scalp  and let soak for 15 minutes then rinse    latanoprost (Xalatan) 0.005 % ophthalmic solution Administer 1 drop into both eyes once daily at bedtime.    levothyroxine (SYNTHROID, LEVOXYL) 137 mcg, oral, Daily    lisinopriL-hydrochlorothiazide 20-25 mg tablet 1 tablet, oral, Daily    lisinopril 20 mg, oral, Daily    metoprolol tartrate (LOPRESSOR) 25 mg, oral, 2 times daily    topiramate (TOPAMAX) 50 mg, oral, 2 times daily    triamcinolone (Kenalog) 0.1 % ointment Apply to scalp twice daily      Vitals  BP Readings from Last 2 Encounters:   07/10/24 123/74   04/03/24 140/70     BMI Readings from Last 1 Encounters:   07/10/24 51.60 kg/m²      Labs  A1C  Lab Results   Component Value Date    HGBA1C 5.7 (A) 06/20/2023    HGBA1C 5.6 08/04/2022    HGBA1C 5.8 (A) 02/22/2022     BMP  Lab Results   Component Value Date    CALCIUM 10.0 06/19/2024     06/19/2024    K 4.0 06/19/2024    CO2 27 06/19/2024     06/19/2024    BUN 18 06/19/2024    CREATININE 1.21 (H) 06/19/2024    EGFR 49 (L) 06/19/2024     LFTs  Lab Results   Component Value Date    ALT 7 06/19/2024    AST 11 06/19/2024    ALKPHOS 65 06/19/2024     BILITOT 0.4 06/19/2024     FLP  Lab Results   Component Value Date    TRIG 97 06/19/2024    CHOL 194 06/19/2024    LDLCALC 112 (H) 06/19/2024    HDL 63.1 06/19/2024     Urine Microalbumin  Lab Results   Component Value Date    MICROALBCREA 5.5 06/19/2024     Weight Management  Wt Readings from Last 3 Encounters:   07/10/24 136 kg (300 lb 9.6 oz)   04/03/24 136 kg (298 lb 12.8 oz)   09/08/23 135 kg (296 lb 12.8 oz)      There is no height or weight on file to calculate BMI.      Assessment/Plan   Problem List Items Addressed This Visit       Stage 3a chronic kidney disease (Multi)     ASSESSMENT OF CHRONIC KIDNEY DISEASE  Patient's CKD is well controlled.  Rationale for plan: Patient should continue the Jardiance and lisinopril therapy. These medications have a protective effect on the kidneys, which is important for CKD to prevent decline.     Medication Changes:  Continue  Jardiance 10 mg daily  Lisinopril 20 mg daily  Lisinopril-hydrochlorothiazide 20-25 mg daily    Monitoring and Education Discussed:  Reviewed CKD lab results and benefits of good hydration  Discussed avoidance of NSAIDs, and use of Tylenol for headaches/pain  Recommended need for good control of blood pressure and blood glucose  Advised to report any changes in fluid retention, weight or decreased urinary output  Counseled patient on MOA, expectations, duration of therapy, contraindications, administration, and monitoring parameters   Answered all patient questions and concerns            Clinical Pharmacist follow-up: 10/25/2024, Telehealth visit    Continue all meds under the continuation of care with the referring provider and clinical pharmacy team.    Thank you,  Rebecca Fierro, PharmD  Clinical Pharmacist - Primary Care  [phone]  7/25/2024    Verbal consent to manage patient's drug therapy was obtained from the patient. They were informed they may decline to participate or withdraw from participation in pharmacy services at any time.

## 2024-07-25 NOTE — ASSESSMENT & PLAN NOTE
ASSESSMENT OF CHRONIC KIDNEY DISEASE  Patient's CKD is well controlled.  Rationale for plan: Patient should continue the Jardiance and lisinopril therapy. These medications have a protective effect on the kidneys, which is important for CKD to prevent decline.     Medication Changes:  Continue  Jardiance 10 mg daily  Lisinopril 20 mg daily  Lisinopril-hydrochlorothiazide 20-25 mg daily    Monitoring and Education Discussed:  Reviewed CKD lab results and benefits of good hydration  Discussed avoidance of NSAIDs, and use of Tylenol for headaches/pain  Recommended need for good control of blood pressure and blood glucose  Advised to report any changes in fluid retention, weight or decreased urinary output  Counseled patient on MOA, expectations, duration of therapy, contraindications, administration, and monitoring parameters   Answered all patient questions and concerns

## 2024-07-30 NOTE — TELEPHONE ENCOUNTER
Patient states [facility] cancelled the order for the BP cuff , they do not carry her size, only up to 12  Paperwork was faxed back to us  07/29/24  Please advise

## 2024-08-07 NOTE — PROGRESS NOTES
"Subjective     Date: 8/7/2024 Time: 10:14 AM  Name: Barbara Schultz  MRN: [id number]    This is a 69 y.o. female with morbid obesity (There is no height or weight on file to calculate BMI.) who presents to clinic for consideration of bariatric surgery. she has attempted and failed multiple diet and exercise regimens for weight loss. Initial Onset of obesity was {Initial Obesity Onset:62044:::0}.  Their goal for surgery is to  {Weight Loss Interest:97652}. The patient has tried multiple diets to lose weight including {Previous Diet Trials:42051}. The patient was most successful with the {Most Successful Diet:00028}. The most pounds lost on this diet were *** lbs. The patient considers their dietary weakness to be {Dietary Weakness:47643} The patient reports a  {Weight Pattern:81671::\"highest weight ever of *** pounds\",\"lowest weight ever of *** pounds\"} {Distribution of Obesity:62068}. Current diet: {Diet:46161}. Compliance: {Compliance:65467} Diet Problems: {Diet Problems:88102} } Dietary Details Include:{Dietary Details:08197} The patient {Exercise Frequency:88748} {Excercise Duration (Optional):01156} {Types of Exercise (Optional):41663}    {Comorbidities:42306}  Patient Active Problem List   Diagnosis    Apnea, sleep    Benign paroxysmal positional vertigo, bilateral    Bilateral edema of lower extremity    Bronchitis    Changes in vision    Chronic low back pain    Cough    Difficulty walking    Essential hypertension    Fatigue    Flat feet    Glaucoma    Has daytime drowsiness    Hip pain, left    Hyperlipidemia    Hypothyroidism    Intractable left heel pain    Left flank pain    Leg weakness    Lower leg edema    Microscopic hematuria    Nausea in adult    Nocturia    Non-compliant behavior    Obesity, morbid, BMI 50 or higher (Multi)    Pain in left knee    Pain in right knee    Pelvic pressure in female    Prediabetes    Sinus congestion    Sleep-disordered breathing    Snoring    Stage 3a chronic kidney " disease (Multi)    Stress    Swollen L knee    Paresis of single lower extremity (Multi)    Uncontrolled hypertension    Urinary frequency    UTI (urinary tract infection)    Vitamin D deficiency    Weakness of left lower extremity    Abnormal chest xray    Benign neoplasm of skin of trunk    Benign neoplasm soft tissue    Diverticulitis of colon    LOAN (iron deficiency anemia)    Primary open angle glaucoma    Depression, recurrent (CMS-HCC)       {Procedure Preferred:72590}    {GERDQOL:61070}    PMH:   Past Medical History:   Diagnosis Date    Abnormal findings on diagnostic imaging of other specified body structures     Abnormal chest x-ray    Diverticulosis of large intestine without perforation or abscess without bleeding     Diverticulosis of colon    Other benign neoplasm of skin of trunk     Other benign neoplasm of skin of trunk    Personal history of diseases of the blood and blood-forming organs and certain disorders involving the immune mechanism     History of iron deficiency anemia    Personal history of other diseases of the circulatory system     History of hypertension    Personal history of other diseases of the respiratory system 04/28/2016    History of allergic rhinitis    Personal history of other diseases of the respiratory system 04/28/2016    History of allergic rhinitis    Personal history of other diseases of the respiratory system 04/28/2016    History of allergic rhinitis    Personal history of other endocrine, nutritional and metabolic disease     History of obesity    Presence of cardiac pacemaker 04/28/2016    History of cardiac pacemaker    Primary open-angle glaucoma, unspecified eye, stage unspecified     Glaucoma primary, open angle        PSH: No past surgical history on file.       FAMILY HISTORY:  Family History   Problem Relation Name Age of Onset    Hypertension Mother      Sleep apnea Sister          on CPAP        SOCIAL HISTORY:  Social History     Tobacco Use    Smoking  status: Former     Types: Cigarettes    Smokeless tobacco: Never   Substance Use Topics    Alcohol use: Never    Drug use: Never       MEDICATIONS:  Prior to Admission Medications:  Medication Documentation Review Audit       Reviewed by PERRY Mullins (Nurse Practitioner) on 07/22/24 at 1954      Medication Order Taking? Sig Documenting Provider Last Dose Status   acetaminophen (Tylenol) 500 mg tablet [id number]  TAKE 1 to 2 tablets every 8 to 12 as needed for arthritis PERRY Mullins  Active   amLODIPine (Norvasc) 10 mg tablet [id number] Yes Take 1 tablet (10 mg) by mouth once daily. PERRY Mullins  Active   blood pressure monitor kit [id number]  Use to test blood pressure once daily or as directed PERRY Mullins  Active   cholecalciferol (Vitamin D3) 50 mcg (2,000 unit) capsule [id number]  Take 1 capsule (50 mcg) by mouth once daily. Historical Provider, MD  Active   Clenpiq 10 mg-3.5 gram- 12 gram/175 mL solution [id number]   Historical Provider, MD  Active   diclofenac sodium (Voltaren) 1 % gel [id number]  Apply 4.5 inches (4 g) topically 4 times a day. PERRY Mullins  Active   empagliflozin (Jardiance) 10 mg [id number]  Take 1 tablet (10 mg) by mouth once daily. PERRY Mullins  Active   ergocalciferol (Vitamin D-2) 1.25 MG (96866 UT) capsule [id number] Yes Take 1 capsule (1,250 mcg) by mouth every 14 (fourteen) days. TAKE 1 CAPSULE twice monthly on the 15th and the 30 th for vitamin d deficiency PERRY Mullins  Active   fluticasone (Flonase) 50 mcg/actuation nasal spray [id number] Yes Administer 2 sprays into each nostril once daily. PERRY Mullins  Active   ketoconazole (NIZOral) 2 % shampoo [id number] Yes Appy  to the scalp  and let soak for 15 minutes then rinse PERRY Mullins  Active   latanoprost (Xalatan) 0.005 % ophthalmic solution [id number]  Administer 1 drop into both eyes once daily at bedtime. Historical  Provider, MD  Active   levothyroxine (Synthroid, Levoxyl) 137 mcg tablet [id number] Yes Take 1 tablet (137 mcg) by mouth once daily. PERRY Mullins   07/10/24 2359   lisinopril 20 mg tablet [id number] Yes Take 1 tablet (20 mg) by mouth once daily. PERRY Mullins  Active   lisinopriL-hydrochlorothiazide 20-25 mg tablet [id number]  Take 1 tablet by mouth once daily. PERRY Mullins  Active   metoprolol tartrate (Lopressor) 25 mg tablet [id number] Yes Take 1 tablet (25 mg) by mouth 2 times a day. PERRY Mullins  Active   topiramate (Topamax) 50 mg tablet [id number]  Take 1 tablet (50 mg) by mouth 2 times a day.   Patient taking differently: Take 1 tablet (50 mg) by mouth once daily.    PERRY Mullins  Active   triamcinolone (Kenalog) 0.1 % ointment [id number]  Apply to scalp twice daily   Patient taking differently: Apply to scalp twice daily three times weekly    PERRY Mullins  Active                     ALLERGIES:  No Known Allergies    REVIEW OF SYSTEMS:  GENERAL: Negative for malaise, significant weight loss and fever  HEAD: Negative for headache, swelling.  NECK: Negative for lumps, goiter, pain and significant neck swelling  RESPIRATORY: Negative for cough, wheezing or shortness of breath.  CARDIOVASCULAR: Negative for chest pain, leg swelling or palpitations.  GI: Negative for abdominal discomfort, blood in stools or black stools or change in bowel habits  : No history of dysuria, frequency or incontinence  MUSCULOSKELETAL: Negative for joint pain or swelling, back pain or muscle pain.  SKIN: Negative for lesions, rash, and itching.  PSYCH: Negative for sleep disturbance, mood disorder and recent psychosocial stressors.  ENDOCRINE: Negative for cold or heat intolerance, polyuria, polydipsia and goiter.    Objective   PHYSICAL EXAM:  Visit Vitals  OB Status Postmenopausal   Smoking Status Former     General appearance: obese, NAD  Neuro:  AOx3  Head: EOMI; no swelling or lesions of scalp or face  ENT:  no lumps or lymphadenopathy, thyroid normal to palpation; oropharynx clear, no swelling or erythema  Skin: warm, no erythema or rashes  Lungs: clear to percussion and auscultation  Heart: regular rhythm and S1, S2 normal  Abdomen: soft, non-tender, no masses, no organomegaly  Extremities: Normal exam of the extremities. No swelling or pain.  Psych: no hurried speech, no flight of ideas, normal affect    Assessment/Plan   IMPRESSION:  Barbara Schultz is a 69 y.o. female with a BMI of There is no height or weight on file to calculate BMI. with the following diagnoses and co-morbidities:     Past Medical History:   Diagnosis Date    Abnormal findings on diagnostic imaging of other specified body structures     Abnormal chest x-ray    Diverticulosis of large intestine without perforation or abscess without bleeding     Diverticulosis of colon    Other benign neoplasm of skin of trunk     Other benign neoplasm of skin of trunk    Personal history of diseases of the blood and blood-forming organs and certain disorders involving the immune mechanism     History of iron deficiency anemia    Personal history of other diseases of the circulatory system     History of hypertension    Personal history of other diseases of the respiratory system 04/28/2016    History of allergic rhinitis    Personal history of other diseases of the respiratory system 04/28/2016    History of allergic rhinitis    Personal history of other diseases of the respiratory system 04/28/2016    History of allergic rhinitis    Personal history of other endocrine, nutritional and metabolic disease     History of obesity    Presence of cardiac pacemaker 04/28/2016    History of cardiac pacemaker    Primary open-angle glaucoma, unspecified eye, stage unspecified     Glaucoma primary, open angle       This patient does meet the criteria for a surgical weight loss procedure according to NIH  guidelines.  The risks of sleeve gastrectomy, Felix-en-Y gastric bypass, and duodenal switch surgery including but not limited to bleeding, leak along staple lines, infection, dehydration, ulcers, internal hernia, DVT/PE, pneumonia, myocardial infarction, prolonged nausea/vomiting, incomplete resolution of associated medical conditions, reflux, weight regain, vitamin/mineral deficiencies, and death have been explained to the patient and Barbara Schultz has expressed understanding and acceptance of them.     We discussed the lifestyle changes necessary to be successful following surgery.    The increased risk of substance and alcohol abuse following bariatric surgery was discussed with the patient, along with the negative consequences of substance/alcohol use after surgery including addiction, worsening of mental health disorders, and injury to the stomach. The risk of smoking and vaping (tobacco or any other substance) after bariatric surgery was explained to the patient. This includes risk of anastamotic ulcers, gastritis, bleeding, perforation, stricture, and PO intolerance.  The patient expressed understanding and acceptance of these risks.    ***The patient was advised not to become pregnant within 12-18 months following bariatric surgery. She was educated on the increased risks to mother and fetus associated with pregnancy within 2 years of bariatric surgery.    The benefits of the above surgeries including weight loss, improvement/resolution of associated medical and mental health conditions, improved mobility, and decreased mortality have been explained the the patient and Barbara Schultz has expressed understanding and acceptance of them.      PLAN:  The plan of treatment for Barbara Schultz is to continue with the consultations and tests ordered today in hopes of qualifying for pre-operative clearance for bariatric surgery. This includes:    Consult Nutrition for education and MSWL  Consult Psychology  Consult  Cardiology  Labs ordered  EGD  PCP for medical optimization  Consult sleep medicine - concern for JAVIER  Recommend at least *** lbs of weight loss prior to surgery.  ***        *** minutes were spent with patient including history, physical exam, and education.

## 2024-08-13 ENCOUNTER — APPOINTMENT (OUTPATIENT)
Dept: SURGERY | Facility: HOSPITAL | Age: 69
End: 2024-08-13
Payer: COMMERCIAL

## 2024-08-13 DIAGNOSIS — Z98.84 BARIATRIC SURGERY STATUS: ICD-10-CM

## 2024-08-13 DIAGNOSIS — E66.01 MORBID OBESITY WITH BODY MASS INDEX (BMI) OF 50.0 TO 59.9 IN ADULT (MULTI): ICD-10-CM

## 2024-08-14 ENCOUNTER — TELEPHONE (OUTPATIENT)
Dept: OBSTETRICS AND GYNECOLOGY | Facility: CLINIC | Age: 69
End: 2024-08-14
Payer: COMMERCIAL

## 2024-08-27 ENCOUNTER — APPOINTMENT (OUTPATIENT)
Dept: OBSTETRICS AND GYNECOLOGY | Facility: CLINIC | Age: 69
End: 2024-08-27
Payer: COMMERCIAL

## 2024-09-24 NOTE — PROGRESS NOTES
"Subjective     Date: 9/24/2024 Time: 3:36 PM  Name: Barbara Schultz  MRN: [id number]    This is a 69 y.o. female with morbid obesity (There is no height or weight on file to calculate BMI.) who presents to clinic for consideration of bariatric surgery. she has attempted and failed multiple diet and exercise regimens for weight loss. Initial Onset of obesity was {Initial Obesity Onset:43008:::0}.  Their goal for surgery is to  {Weight Loss Interest:31077}. The patient has tried multiple diets to lose weight including {Previous Diet Trials:21346}. The patient was most successful with the {Most Successful Diet:80540}. The most pounds lost on this diet were *** lbs. The patient considers their dietary weakness to be {Dietary Weakness:76424} The patient reports a  {Weight Pattern:04357::\"highest weight ever of *** pounds\",\"lowest weight ever of *** pounds\"} {Distribution of Obesity:82801}. Current diet: {Diet:41069}. Compliance: {Compliance:78004} Diet Problems: {Diet Problems:64648} } Dietary Details Include:{Dietary Details:65765} The patient {Exercise Frequency:94245} {Excercise Duration (Optional):28876} {Types of Exercise (Optional):53470}    {Comorbidities:42398}  Patient Active Problem List   Diagnosis    Apnea, sleep    Benign paroxysmal positional vertigo, bilateral    Bilateral edema of lower extremity    Bronchitis    Changes in vision    Chronic low back pain    Cough    Difficulty walking    Essential hypertension    Fatigue    Flat feet    Glaucoma    Has daytime drowsiness    Hip pain, left    Hyperlipidemia    Hypothyroidism    Intractable left heel pain    Left flank pain    Leg weakness    Lower leg edema    Microscopic hematuria    Nausea in adult    Nocturia    Non-compliant behavior    Obesity, morbid, BMI 50 or higher (Multi)    Pain in left knee    Pain in right knee    Pelvic pressure in female    Prediabetes    Sinus congestion    Sleep-disordered breathing    Snoring    Stage 3a chronic kidney " disease (Multi)    Stress    Swollen L knee    Paresis of single lower extremity (Multi)    Uncontrolled hypertension    Urinary frequency    UTI (urinary tract infection)    Vitamin D deficiency    Weakness of left lower extremity    Abnormal chest xray    Benign neoplasm of skin of trunk    Benign neoplasm soft tissue    Diverticulitis of colon    LOAN (iron deficiency anemia)    Primary open angle glaucoma    Depression, recurrent (CMS-HCC)       {Procedure Preferred:48500}    {GERDQOL:50353}    PMH:   Past Medical History:   Diagnosis Date    Abnormal findings on diagnostic imaging of other specified body structures     Abnormal chest x-ray    Diverticulosis of large intestine without perforation or abscess without bleeding     Diverticulosis of colon    Other benign neoplasm of skin of trunk     Other benign neoplasm of skin of trunk    Personal history of diseases of the blood and blood-forming organs and certain disorders involving the immune mechanism     History of iron deficiency anemia    Personal history of other diseases of the circulatory system     History of hypertension    Personal history of other diseases of the respiratory system 04/28/2016    History of allergic rhinitis    Personal history of other diseases of the respiratory system 04/28/2016    History of allergic rhinitis    Personal history of other diseases of the respiratory system 04/28/2016    History of allergic rhinitis    Personal history of other endocrine, nutritional and metabolic disease     History of obesity    Presence of cardiac pacemaker 04/28/2016    History of cardiac pacemaker    Primary open-angle glaucoma, unspecified eye, stage unspecified     Glaucoma primary, open angle        PSH: No past surgical history on file.       FAMILY HISTORY:  Family History   Problem Relation Name Age of Onset    Hypertension Mother      Sleep apnea Sister          on CPAP        SOCIAL HISTORY:  Social History     Tobacco Use    Smoking  status: Former     Types: Cigarettes    Smokeless tobacco: Never   Substance Use Topics    Alcohol use: Never    Drug use: Never       MEDICATIONS:  Prior to Admission Medications:  Medication Documentation Review Audit       Reviewed by PERRY Mullins (Nurse Practitioner) on 07/22/24 at 1954      Medication Order Taking? Sig Documenting Provider Last Dose Status   acetaminophen (Tylenol) 500 mg tablet [id number]  TAKE 1 to 2 tablets every 8 to 12 as needed for arthritis PERRY Mullins  Active   amLODIPine (Norvasc) 10 mg tablet [id number] Yes Take 1 tablet (10 mg) by mouth once daily. PERRY Mullins  Active   blood pressure monitor kit [id number]  Use to test blood pressure once daily or as directed PERRY Mullins  Active   cholecalciferol (Vitamin D3) 50 mcg (2,000 unit) capsule [id number]  Take 1 capsule (50 mcg) by mouth once daily. Historical Provider, MD  Active   Clenpiq 10 mg-3.5 gram- 12 gram/175 mL solution [id number]   Historical Provider, MD  Active   diclofenac sodium (Voltaren) 1 % gel [id number]  Apply 4.5 inches (4 g) topically 4 times a day. PERRY Mullins  Active   empagliflozin (Jardiance) 10 mg [id number]  Take 1 tablet (10 mg) by mouth once daily. PERRY Mullins  Active   ergocalciferol (Vitamin D-2) 1.25 MG (06311 UT) capsule [id number] Yes Take 1 capsule (1,250 mcg) by mouth every 14 (fourteen) days. TAKE 1 CAPSULE twice monthly on the 15th and the 30 th for vitamin d deficiency PERRY Mullins  Active   fluticasone (Flonase) 50 mcg/actuation nasal spray [id number] Yes Administer 2 sprays into each nostril once daily. PERRY Mullins  Active   ketoconazole (NIZOral) 2 % shampoo [id number] Yes Appy  to the scalp  and let soak for 15 minutes then rinse PERRY Mullins  Active   latanoprost (Xalatan) 0.005 % ophthalmic solution [id number]  Administer 1 drop into both eyes once daily at bedtime. Historical  Provider, MD  Active   levothyroxine (Synthroid, Levoxyl) 137 mcg tablet [id number] Yes Take 1 tablet (137 mcg) by mouth once daily. PERRY Mullins   07/10/24 2359   lisinopril 20 mg tablet [id number] Yes Take 1 tablet (20 mg) by mouth once daily. PERRY Mullins  Active   lisinopriL-hydrochlorothiazide 20-25 mg tablet [id number]  Take 1 tablet by mouth once daily. PERRY Mullins  Active   metoprolol tartrate (Lopressor) 25 mg tablet [id number] Yes Take 1 tablet (25 mg) by mouth 2 times a day. PERRY Mullins  Active   topiramate (Topamax) 50 mg tablet [id number]  Take 1 tablet (50 mg) by mouth 2 times a day.   Patient taking differently: Take 1 tablet (50 mg) by mouth once daily.    PERRY Mullins  Active   triamcinolone (Kenalog) 0.1 % ointment [id number]  Apply to scalp twice daily   Patient taking differently: Apply to scalp twice daily three times weekly    PERRY Mullins  Active                     ALLERGIES:  No Known Allergies    REVIEW OF SYSTEMS:  GENERAL: Negative for malaise, significant weight loss and fever  HEAD: Negative for headache, swelling.  NECK: Negative for lumps, goiter, pain and significant neck swelling  RESPIRATORY: Negative for cough, wheezing or shortness of breath.  CARDIOVASCULAR: Negative for chest pain, leg swelling or palpitations.  GI: Negative for abdominal discomfort, blood in stools or black stools or change in bowel habits  : No history of dysuria, frequency or incontinence  MUSCULOSKELETAL: Negative for joint pain or swelling, back pain or muscle pain.  SKIN: Negative for lesions, rash, and itching.  PSYCH: Negative for sleep disturbance, mood disorder and recent psychosocial stressors.  ENDOCRINE: Negative for cold or heat intolerance, polyuria, polydipsia and goiter.    Objective   PHYSICAL EXAM:  Visit Vitals  OB Status Postmenopausal   Smoking Status Former     General appearance: obese, NAD  Neuro:  AOx3  Head: EOMI; no swelling or lesions of scalp or face  ENT:  no lumps or lymphadenopathy, thyroid normal to palpation; oropharynx clear, no swelling or erythema  Skin: warm, no erythema or rashes  Lungs: clear to percussion and auscultation  Heart: regular rhythm and S1, S2 normal  Abdomen: soft, non-tender, no masses, no organomegaly  Extremities: Normal exam of the extremities. No swelling or pain.  Psych: no hurried speech, no flight of ideas, normal affect    Assessment/Plan   IMPRESSION:  Barbara Schultz is a 69 y.o. female with a BMI of There is no height or weight on file to calculate BMI. with the following diagnoses and co-morbidities:     Past Medical History:   Diagnosis Date    Abnormal findings on diagnostic imaging of other specified body structures     Abnormal chest x-ray    Diverticulosis of large intestine without perforation or abscess without bleeding     Diverticulosis of colon    Other benign neoplasm of skin of trunk     Other benign neoplasm of skin of trunk    Personal history of diseases of the blood and blood-forming organs and certain disorders involving the immune mechanism     History of iron deficiency anemia    Personal history of other diseases of the circulatory system     History of hypertension    Personal history of other diseases of the respiratory system 04/28/2016    History of allergic rhinitis    Personal history of other diseases of the respiratory system 04/28/2016    History of allergic rhinitis    Personal history of other diseases of the respiratory system 04/28/2016    History of allergic rhinitis    Personal history of other endocrine, nutritional and metabolic disease     History of obesity    Presence of cardiac pacemaker 04/28/2016    History of cardiac pacemaker    Primary open-angle glaucoma, unspecified eye, stage unspecified     Glaucoma primary, open angle       This patient does meet the criteria for a surgical weight loss procedure according to NIH  guidelines.  The risks of sleeve gastrectomy, Felix-en-Y gastric bypass, and duodenal switch surgery including but not limited to bleeding, leak along staple lines, infection, dehydration, ulcers, internal hernia, DVT/PE, pneumonia, myocardial infarction, prolonged nausea/vomiting, incomplete resolution of associated medical conditions, reflux, weight regain, vitamin/mineral deficiencies, and death have been explained to the patient and Barbara Schultz has expressed understanding and acceptance of them.     We discussed the lifestyle changes necessary to be successful following surgery.    The increased risk of substance and alcohol abuse following bariatric surgery was discussed with the patient, along with the negative consequences of substance/alcohol use after surgery including addiction, worsening of mental health disorders, and injury to the stomach. The risk of smoking and vaping (tobacco or any other substance) after bariatric surgery was explained to the patient. This includes risk of anastamotic ulcers, gastritis, bleeding, perforation, stricture, and PO intolerance.  The patient expressed understanding and acceptance of these risks.    ***The patient was advised not to become pregnant within 12-18 months following bariatric surgery. She was educated on the increased risks to mother and fetus associated with pregnancy within 2 years of bariatric surgery.    The benefits of the above surgeries including weight loss, improvement/resolution of associated medical and mental health conditions, improved mobility, and decreased mortality have been explained the the patient and Barbara Schultz has expressed understanding and acceptance of them.      PLAN:  The plan of treatment for Barbara Schultz is to continue with the consultations and tests ordered today in hopes of qualifying for pre-operative clearance for bariatric surgery. This includes:    Consult Nutrition for education and MSWL  Consult Psychology  Consult  Cardiology  Labs ordered  EGD  PCP for medical optimization  Consult sleep medicine - concern for JAVIER  Recommend at least *** lbs of weight loss prior to surgery.  ***        *** minutes were spent with patient including history, physical exam, and education.

## 2024-09-27 ENCOUNTER — APPOINTMENT (OUTPATIENT)
Dept: PRIMARY CARE | Facility: CLINIC | Age: 69
End: 2024-09-27
Payer: COMMERCIAL

## 2024-09-27 ENCOUNTER — HOSPITAL ENCOUNTER (OUTPATIENT)
Dept: RADIOLOGY | Facility: CLINIC | Age: 69
Discharge: HOME | End: 2024-09-27
Payer: COMMERCIAL

## 2024-09-27 DIAGNOSIS — Z78.0 ASYMPTOMATIC MENOPAUSAL STATE: ICD-10-CM

## 2024-09-27 PROCEDURE — 77080 DXA BONE DENSITY AXIAL: CPT

## 2024-10-01 ENCOUNTER — APPOINTMENT (OUTPATIENT)
Dept: SURGERY | Facility: HOSPITAL | Age: 69
End: 2024-10-01
Payer: COMMERCIAL

## 2024-10-01 DIAGNOSIS — E66.01 MORBID OBESITY WITH BMI OF 50.0-59.9, ADULT (MULTI): ICD-10-CM

## 2024-10-01 DIAGNOSIS — Z98.84 BARIATRIC SURGERY STATUS: ICD-10-CM

## 2024-10-03 DIAGNOSIS — M25.552 HIP PAIN, LEFT: ICD-10-CM

## 2024-10-03 RX ORDER — ACETAMINOPHEN 500 MG
TABLET ORAL
Qty: 30 TABLET | Refills: 1 | Status: SHIPPED | OUTPATIENT
Start: 2024-10-03

## 2024-10-09 ENCOUNTER — APPOINTMENT (OUTPATIENT)
Dept: PRIMARY CARE | Facility: CLINIC | Age: 69
End: 2024-10-09
Payer: COMMERCIAL

## 2024-10-09 VITALS
SYSTOLIC BLOOD PRESSURE: 138 MMHG | BODY MASS INDEX: 50.02 KG/M2 | HEIGHT: 64 IN | WEIGHT: 293 LBS | OXYGEN SATURATION: 96 % | DIASTOLIC BLOOD PRESSURE: 72 MMHG | HEART RATE: 78 BPM | RESPIRATION RATE: 14 BRPM | TEMPERATURE: 96 F

## 2024-10-09 DIAGNOSIS — M25.561 RIGHT KNEE PAIN, UNSPECIFIED CHRONICITY: ICD-10-CM

## 2024-10-09 DIAGNOSIS — G47.33 OSA (OBSTRUCTIVE SLEEP APNEA): Primary | ICD-10-CM

## 2024-10-09 DIAGNOSIS — Z12.31 ENCOUNTER FOR SCREENING MAMMOGRAM FOR MALIGNANT NEOPLASM OF BREAST: ICD-10-CM

## 2024-10-09 DIAGNOSIS — I10 PRIMARY HYPERTENSION: ICD-10-CM

## 2024-10-09 DIAGNOSIS — M54.50 BILATERAL LOW BACK PAIN, UNSPECIFIED CHRONICITY, UNSPECIFIED WHETHER SCIATICA PRESENT: ICD-10-CM

## 2024-10-09 DIAGNOSIS — E03.9 HYPOTHYROIDISM, UNSPECIFIED TYPE: ICD-10-CM

## 2024-10-09 DIAGNOSIS — E55.9 VITAMIN D DEFICIENCY: ICD-10-CM

## 2024-10-09 DIAGNOSIS — I10 ESSENTIAL HYPERTENSION: ICD-10-CM

## 2024-10-09 PROBLEM — K57.30 DIVERTICULOSIS OF COLON: Status: ACTIVE | Noted: 2024-10-09

## 2024-10-09 PROBLEM — Z86.39 HISTORY OF OBESITY: Status: ACTIVE | Noted: 2024-10-09

## 2024-10-09 PROBLEM — Z86.79 HISTORY OF HYPERTENSION: Status: ACTIVE | Noted: 2024-10-09

## 2024-10-09 PROBLEM — D12.6 TUBULAR ADENOMA OF COLON: Status: ACTIVE | Noted: 2024-10-09

## 2024-10-09 PROBLEM — M21.40 ACQUIRED PES PLANUS: Status: ACTIVE | Noted: 2024-10-09

## 2024-10-09 PROBLEM — Z86.2 HISTORY OF IRON DEFICIENCY ANEMIA: Status: ACTIVE | Noted: 2024-10-09

## 2024-10-09 PROBLEM — H25.10 NUCLEAR SENILE CATARACT: Status: ACTIVE | Noted: 2024-10-09

## 2024-10-09 PROCEDURE — 1159F MED LIST DOCD IN RCRD: CPT | Performed by: NURSE PRACTITIONER

## 2024-10-09 PROCEDURE — 3075F SYST BP GE 130 - 139MM HG: CPT | Performed by: NURSE PRACTITIONER

## 2024-10-09 PROCEDURE — 3008F BODY MASS INDEX DOCD: CPT | Performed by: NURSE PRACTITIONER

## 2024-10-09 PROCEDURE — 3078F DIAST BP <80 MM HG: CPT | Performed by: NURSE PRACTITIONER

## 2024-10-09 PROCEDURE — 99214 OFFICE O/P EST MOD 30 MIN: CPT | Performed by: NURSE PRACTITIONER

## 2024-10-09 RX ORDER — LISINOPRIL 20 MG/1
20 TABLET ORAL DAILY
Qty: 100 TABLET | Refills: 1 | Status: SHIPPED | OUTPATIENT
Start: 2024-10-09 | End: 2025-11-13

## 2024-10-09 RX ORDER — LEVOTHYROXINE SODIUM 137 UG/1
137 TABLET ORAL DAILY
Qty: 90 TABLET | Refills: 0 | Status: SHIPPED | OUTPATIENT
Start: 2024-10-09 | End: 2024-10-09 | Stop reason: SDUPTHER

## 2024-10-09 RX ORDER — LEVOTHYROXINE SODIUM 137 UG/1
137 TABLET ORAL DAILY
Qty: 90 TABLET | Refills: 3 | Status: SHIPPED | OUTPATIENT
Start: 2024-10-09

## 2024-10-09 RX ORDER — METOPROLOL TARTRATE 25 MG/1
25 TABLET, FILM COATED ORAL 2 TIMES DAILY
Qty: 90 TABLET | Refills: 2 | Status: SHIPPED | OUTPATIENT
Start: 2024-10-09

## 2024-10-09 RX ORDER — ERGOCALCIFEROL 1.25 MG/1
1.25 CAPSULE ORAL
Qty: 6 CAPSULE | Refills: 2 | Status: SHIPPED | OUTPATIENT
Start: 2024-10-09

## 2024-10-09 RX ORDER — DEXTROMETHORPHAN HYDROBROMIDE, GUAIFENESIN 5; 100 MG/5ML; MG/5ML
650 LIQUID ORAL EVERY 8 HOURS PRN
Qty: 90 TABLET | Refills: 11 | Status: SHIPPED | OUTPATIENT
Start: 2024-10-09

## 2024-10-09 RX ORDER — AMLODIPINE BESYLATE 10 MG/1
10 TABLET ORAL DAILY
Qty: 90 TABLET | Refills: 1 | Status: SHIPPED | OUTPATIENT
Start: 2024-10-09

## 2024-10-09 RX ORDER — AMLODIPINE BESYLATE 10 MG/1
10 TABLET ORAL DAILY
Qty: 90 TABLET | Refills: 1 | Status: SHIPPED | OUTPATIENT
Start: 2024-10-09 | End: 2024-10-09 | Stop reason: SDUPTHER

## 2024-10-09 NOTE — PATIENT INSTRUCTIONS
The call center is [address] [place] [address] and ask to get an appointment with jan wade md in [facility]     Think about getting the rsv vaccine   It can be gotten at [facility]

## 2024-10-09 NOTE — PROGRESS NOTES
"Subjective   Patient ID: Barbara Schultz is a 69 y.o. female who presents for Follow-up (Pt is here for HTN fuv.).    HPI   The patient has prediabetes with her last aic level at 5.7   She had a sleep study done  showing randy   She does not feel always tired   She watches tv late at night and goes to bed at 10 to 11 and then wakes up at 4 to 5 pm she watches tv she takes a nap during the day   She gets up to take her grandchild to school  , she was told she snores at night   She has had ckd and was started on jardiance to help her kidneys , her  gfr is 49  She walks around stores for exercises     Review of Systems. Negative except what was mentioned in the HPI         Objective   /72 (Patient Position: Sitting)   Pulse 78   Temp 35.6 °C (96 °F)   Resp 14   Ht 1.626 m (5' 4\")   Wt 137 kg (303 lb)   SpO2 96%   BMI 52.01 kg/m²     Physical Exam  Vitals and nursing note reviewed.   Constitutional:       Appearance: Normal appearance.   HENT:      Head: Normocephalic and atraumatic.      Right Ear: Tympanic membrane normal.      Left Ear: Tympanic membrane normal.      Nose: Nose normal.      Mouth/Throat:      Mouth: Mucous membranes are moist.   Eyes:      Extraocular Movements: Extraocular movements intact.      Conjunctiva/sclera: Conjunctivae normal.   Cardiovascular:      Rate and Rhythm: Normal rate and regular rhythm.      Pulses: Normal pulses.      Heart sounds: Normal heart sounds.   Pulmonary:      Effort: Pulmonary effort is normal.      Breath sounds: Normal breath sounds.   Abdominal:      General: Abdomen is flat. Bowel sounds are normal.      Palpations: Abdomen is soft.   Musculoskeletal:         General: Normal range of motion.      Cervical back: Normal range of motion and neck supple.   Skin:     General: Skin is warm and dry.   Neurological:      General: No focal deficit present.      Mental Status: She is alert and oriented to person, place, and time. Mental status is at baseline. "   Psychiatric:         Mood and Affect: Mood normal.         Behavior: Behavior normal.         Thought Content: Thought content normal.         Judgment: Judgment normal.         Assessment/Plan   Problem List Items Addressed This Visit             ICD-10-CM    Essential hypertension I10    Relevant Medications    lisinopril 20 mg tablet    metoprolol tartrate (Lopressor) 25 mg tablet    amLODIPine (Norvasc) 10 mg tablet    Hypothyroidism E03.9    Relevant Medications    levothyroxine (Synthroid, Levoxyl) 137 mcg tablet    Pain in right knee M25.561    Vitamin D deficiency E55.9    Relevant Medications    ergocalciferol (Vitamin D-2) 1.25 MG (77281 UT) capsule     Other Visit Diagnoses         Codes    JAVIER (obstructive sleep apnea)    -  Primary G47.33    Relevant Orders    Referral to Adult Sleep Medicine    Primary hypertension     I10    Bilateral low back pain, unspecified chronicity, unspecified whether sciatica present     M54.50    Relevant Medications    acetaminophen (Tylenol 8 Hour) 650 mg ER tablet    Encounter for screening mammogram for malignant neoplasm of breast     Z12.31    Relevant Orders    BI mammo bilateral screening tomosynthesis

## 2024-10-17 ENCOUNTER — APPOINTMENT (OUTPATIENT)
Dept: SLEEP MEDICINE | Facility: HOSPITAL | Age: 69
End: 2024-10-17
Payer: COMMERCIAL

## 2024-10-22 ENCOUNTER — DOCUMENTATION (OUTPATIENT)
Dept: SURGERY | Facility: HOSPITAL | Age: 69
End: 2024-10-22
Payer: COMMERCIAL

## 2024-10-22 NOTE — PROGRESS NOTES
"Pt no show'd clinic today. Called the pt, no answer. Voicemail was left. [employer] message sent.    Pt has missed 4 scheduled visits, sending an \"are you still interested\" message via [employer].    Pt will be dropped due to inactivity.  "

## 2024-10-24 ENCOUNTER — APPOINTMENT (OUTPATIENT)
Dept: PHARMACY | Facility: HOSPITAL | Age: 69
End: 2024-10-24
Payer: COMMERCIAL

## 2024-11-04 ENCOUNTER — APPOINTMENT (OUTPATIENT)
Dept: PHARMACY | Facility: HOSPITAL | Age: 69
End: 2024-11-04
Payer: COMMERCIAL

## 2024-11-04 ENCOUNTER — TELEPHONE (OUTPATIENT)
Dept: PRIMARY CARE | Facility: CLINIC | Age: 69
End: 2024-11-04

## 2024-11-04 DIAGNOSIS — N18.31 STAGE 3A CHRONIC KIDNEY DISEASE (MULTI): ICD-10-CM

## 2024-11-04 DIAGNOSIS — R32 URINARY INCONTINENCE, UNSPECIFIED TYPE: Primary | ICD-10-CM

## 2024-11-04 RX ORDER — ACETAMINOPHEN 500 MG
TABLET ORAL
Qty: 1 KIT | Refills: 0 | Status: SHIPPED | OUTPATIENT
Start: 2024-11-04

## 2024-11-04 NOTE — Clinical Note
For your awareness. Patient mentioned during appointment that her insurance mentioned paying for incontinence supplies with a prescription and was wanting to see if that was something you can send. Please reach out with questions/concerns

## 2024-11-04 NOTE — ASSESSMENT & PLAN NOTE
ASSESSMENT OF CHRONIC KIDNEY DISEASE  Patient's CKD is stable.  Rationale for plan: Patient is well controlled with her current medications. Will continue current regimen. Counseled patient to check her BP regularly at home, will send BP monitor.    Medication Changes:  CONTINUE  Jardiance 10 mg; take 1 tablet daily  Lisinopril 20 mg; take 1 tablet daily  Lisinopril-hydrochlorothiazide 20-25 mg; take 1 tablet daily    Monitoring and Education Discussed:  Reviewed CKD lab results and benefits of good hydration  Discussed avoidance of NSAIDs, and use of Tylenol for headaches/pain  Recommended need for good control of blood pressure and blood glucose  Advised to report any changes in fluid retention, weight or decreased urinary output  Counseled patient on MOA, expectations, duration of therapy, contraindications, administration, and monitoring parameters   Answered all patient questions and concerns  Counseled patient to switch PCPs for when Rhonda Bhat leaves  in December

## 2024-11-04 NOTE — PROGRESS NOTES
Clinical Pharmacy Appointment    Patient ID: Barbara Schultz is a 69 y.o. female who presents for Chronic Kidney Disease.    Pt is here for Follow Up appointment.     Referring Provider: Rohnda Bhat APRN*  PCP: MASON Mullins-CNP   Last visit with PCP: 10/9/2024       Subjective   HPI  CHRONIC KIDNEY DISEASE ASSESSMENT  Does patient see nephrology? No    Current medications include:  Jardiance 10 mg; take 1 tablet daily  Lisinopril 20 mg; take 1 tablet daily  Lisinopril-hydrochlorothiazide 20-25 mg; take 1 tablet daily    Clarifications to above regimen: None  Adverse Effects: None    Stage: 3a (eGFR 45-59)  Albuminuria: A1 (<30 mg/g)  ACE/ARB: Yes, Lisinopril 20 mg + lisinopril-hydrochlorothiazide 20-25 mg daily    Medication Review  Current medications and doses were reviewed and are appropriate per current kidney function.  The following medications increase risk of TITO and should be closely monitored:  ACEi and Diuretics    Hypertension History:  HTN diagnosis: yes  Current Regimen  Amlodipine 10 mg daily  Metoprolol tartrate 25 mg BID  Lisinopril 20 mg daily  Lisinopril-hydrochlorothiazide 20-25 mg daily  HTN at goal? no  BP Cuff at home? no  Most recent in office BP from 10/9/2024 was 138/72    Hyperlipidemia History:  Diagnosis? yes  Current Regimen  None  At goal? no  Current LDL: 112 mg/dL  Current T mg/dL    Diabetes History:  Diagnosis? no    Lab Review  Electrolytes: Within normal limits (2024)  Alk phos: Within normal limits (2024)  Sodium bicarb: Within normal limits (2024)  PTH: Need updated labs, last not on file  Hgb: Need updated labs, last from 2023 was normal    Drug Interactions  The following drug interactions were noted: thiazide diuretics can increase serum concentrations of topiramate, may need to monitor for adverse reactions of topiramate such as hypokalemia.    Medication System Management  Patient's preferred pharmacy: [id number] in  [place]  Adherence/Organization: no concerns at this time  Affordability/Accessibility: no concerns at this time      Objective   No Known Allergies  Social History     Social History Narrative    Not on file      Medication Review  Current Outpatient Medications   Medication Instructions    acetaminophen (TYLENOL 8 HOUR) 650 mg, oral, Every 8 hours PRN, Do not crush, chew, or split.    amLODIPine (NORVASC) 10 mg, oral, Daily    blood pressure monitor kit Use to test blood pressure once daily or as directed    cholecalciferol (Vitamin D3) 50 mcg (2,000 unit) capsule 1 capsule, oral, Daily    Clenpiq 10 mg-3.5 gram- 12 gram/175 mL solution     diclofenac sodium (VOLTAREN) 4 g, Topical, 4 times daily    empagliflozin (JARDIANCE) 10 mg, oral, Daily    ergocalciferol (VITAMIN D-2) 1,250 mcg, oral, Every 14 days, TAKE 1 CAPSULE twice monthly on the 15th and the 30 th for vitamin d deficiency    fluticasone (Flonase) 50 mcg/actuation nasal spray 2 sprays, Each Nostril, Daily    ketoconazole (NIZOral) 2 % shampoo Appy  to the scalp  and let soak for 15 minutes then rinse    latanoprost (Xalatan) 0.005 % ophthalmic solution Administer 1 drop into both eyes once daily at bedtime.    levothyroxine (SYNTHROID, LEVOXYL) 137 mcg, oral, Daily    lisinopriL-hydrochlorothiazide 20-25 mg tablet 1 tablet, oral, Daily    lisinopril 20 mg, oral, Daily    metoprolol tartrate (LOPRESSOR) 25 mg, oral, 2 times daily    topiramate (TOPAMAX) 50 mg, oral, 2 times daily    triamcinolone (Kenalog) 0.1 % ointment Apply to scalp twice daily      Vitals  BP Readings from Last 2 Encounters:   10/09/24 138/72   07/10/24 123/74     BMI Readings from Last 1 Encounters:   10/09/24 52.01 kg/m²      Labs  A1C  Lab Results   Component Value Date    HGBA1C 5.7 (A) 06/20/2023    HGBA1C 5.6 08/04/2022    HGBA1C 5.8 (A) 02/22/2022     BMP  Lab Results   Component Value Date    CALCIUM 10.0 06/19/2024     06/19/2024    K 4.0 06/19/2024    CO2 27  06/19/2024     06/19/2024    BUN 18 06/19/2024    CREATININE 1.21 (H) 06/19/2024    EGFR 49 (L) 06/19/2024     LFTs  Lab Results   Component Value Date    ALT 7 06/19/2024    AST 11 06/19/2024    ALKPHOS 65 06/19/2024    BILITOT 0.4 06/19/2024     FLP  Lab Results   Component Value Date    TRIG 97 06/19/2024    CHOL 194 06/19/2024    LDLCALC 112 (H) 06/19/2024    HDL 63.1 06/19/2024     Urine Microalbumin  Lab Results   Component Value Date    MICROALBCREA 5.5 06/19/2024     Weight Management  Wt Readings from Last 3 Encounters:   10/09/24 137 kg (303 lb)   07/10/24 136 kg (300 lb 9.6 oz)   04/03/24 136 kg (298 lb 12.8 oz)      There is no height or weight on file to calculate BMI.     Assessment/Plan   Problem List Items Addressed This Visit       Stage 3a chronic kidney disease (Multi)     ASSESSMENT OF CHRONIC KIDNEY DISEASE  Patient's CKD is stable.  Rationale for plan: Patient is well controlled with her current medications. Will continue current regimen.    Medication Changes:  CONTINUE  Jardiance 10 mg; take 1 tablet daily  Lisinopril 20 mg; take 1 tablet daily  Lisinopril-hydrochlorothiazide 20-25 mg; take 1 tablet daily    Monitoring and Education Discussed:  Reviewed CKD lab results and benefits of good hydration  Discussed avoidance of NSAIDs, and use of Tylenol for headaches/pain  Recommended need for good control of blood pressure and blood glucose  Advised to report any changes in fluid retention, weight or decreased urinary output  Counseled patient on MOA, expectations, duration of therapy, contraindications, administration, and monitoring parameters   Answered all patient questions and concerns  Counseled patient to switch PCPs for when Rhonda Bhat leaves  in December            Clinical Pharmacist follow-up: after patient establishes with new PCP (3 months), Telehealth visit    Continue all meds under the continuation of care with the referring provider and clinical pharmacy  team.    Thank you,  Rebecca Fierro, PharmD  Clinical Pharmacist - [facility]  [phone]      Verbal consent to manage patient's drug therapy was obtained from the patient. They were informed they may decline to participate or withdraw from participation in pharmacy services at any time.

## 2024-11-04 NOTE — TELEPHONE ENCOUNTER
Patient called in asking for a Rx for Depends for her insurance to pay for them for incontinence.   Please send to  [facility] #[id number] - [facility] [address] AT [place] [id number] [facility] [address]

## 2024-11-21 ENCOUNTER — APPOINTMENT (OUTPATIENT)
Dept: UROLOGY | Facility: CLINIC | Age: 69
End: 2024-11-21
Payer: COMMERCIAL

## 2024-11-26 ENCOUNTER — HOSPITAL ENCOUNTER (OUTPATIENT)
Dept: RADIOLOGY | Facility: CLINIC | Age: 69
Discharge: HOME | End: 2024-11-26
Payer: COMMERCIAL

## 2024-11-26 VITALS — BODY MASS INDEX: 50.02 KG/M2 | WEIGHT: 293 LBS | HEIGHT: 64 IN

## 2024-11-26 DIAGNOSIS — Z12.31 ENCOUNTER FOR SCREENING MAMMOGRAM FOR MALIGNANT NEOPLASM OF BREAST: ICD-10-CM

## 2024-11-26 PROCEDURE — 77067 SCR MAMMO BI INCL CAD: CPT | Performed by: RADIOLOGY

## 2024-11-26 PROCEDURE — 77063 BREAST TOMOSYNTHESIS BI: CPT

## 2024-11-26 PROCEDURE — 77063 BREAST TOMOSYNTHESIS BI: CPT | Performed by: RADIOLOGY

## 2024-12-09 PROBLEM — G47.30 SLEEP-DISORDERED BREATHING: Status: RESOLVED | Noted: 2023-02-26 | Resolved: 2024-12-09

## 2024-12-09 NOTE — PROGRESS NOTES
[facility]  [facility]  [address]  [facility] [address]  [phone]     [facility]  New Visit Note      Subjective   Patient ID: Barbara Schultz is a 69 y.o. female with past medical history significant for Morbid Obesity, Hypertension, Depression, and Sleep disorder breathing.     12/18/2024: The patient is alone today and was referred by PCP Rhonda Bhat, APRN-CNP, for a comprehensive sleep medicine evaluation due to sleep apnea. She needs a pap. Her ESS is 4, and RACHEL is 9 today. She had an Home Sleep Study in 2023 but has never started pap yet. She reports that she wakes continuously during the night and cannot sleep well. Therefore, she came here to establish care.       HPI  Patient had been having these symptoms for the past 2 years. Patient had Home Sleep Study  in 2023 which showed JAVIER but no CPAP started yet.      SLEEP STUDY HISTORY: (personally reviewed raw data such as interpretation report, data sheet, hypnogram, and titration table if available and applicable)  1/8/2023: Home Sleep Study: BMI: 53.08, AHI 3%: 59.6/hr, supine AHI 3%: 59.5/hr, AHI 4%: 53.8/hr, Supine AHI 4%: 47.3/hr, Nj: 64.1%, <88%: 56.4 minutes    SLEEP-WAKE SCHEDULE  Bedtime: 11 PM  on weekdays, MN on weekends  Subjective sleep latency: 1 hour  Problems falling asleep: Yes  Number of awakenings: 3 times per night spontaneously for BR  Falls back asleep in 5 -100 minutes  Problems staying asleep: sometimes  Final wake time: 5-6 AM on weekdays, 6 AM on weekends  Out of bed time: 6 AM on weekdays, 7 :10 AM on weekends  Shift work: No  Naps: Yes  Feels rested after a nap: Yes, 1-2 hours  Average sleep duration (excluding naps): 6 hours    SLEEP ENVIRONMENT  Sleep location: bed  Sleep status: sleeps alone  Room is dark:  No, TV on   Room is quiet: No  Room is cool: Yes  Bed comfort: good    SLEEP HABITS:    Activities before bedtime: watch TV  Activities in bed: TV on  Preferred sleep position: back, stomach, and side    SLEEP ROS:  Night symptoms: POSITIVE for snoring, witnessed apnea, and mouth breathing  Morning symptoms: POSITIVE for unrefreshing sleep and morning dry mouth  Daytime symptoms Fatigue  Hypersomnia / narcolepsy symptoms: Patient denies symptoms of a hypersomnolence disorder such as sleep paralysis, sleep-related hallucinations, and cataplexy.   RLS symptoms: Patient denies RLS symptoms.  Movements in sleep: Patient denies problematic movements in sleep such as seizures during sleep, frequent leg kicks / jerks while asleep, sleep-related bruxism, and waking up with bedsheets in disarray.  Parasomnia symptoms: Patient denies symptoms of parasomnia such as sleepwalking, sleeptalking, sleep-eating, acting out dreams, and nightmares.     WEIGHT: stable    REVIEW OF SYSTEMS: All other systems have been reviewed and are negative.    PERTINENT SOCIAL HISTORY:  Occupation: employment status:[id number]}  Smoking: No   ETOH: No   Marijuana: No   Caffeine: Yes  Sleep aids: No   Claustrophobia: No     PERTINENT PAST SURGICAL HISTORY:  non-contributory    PERTINENT FAMILY HISTORY:  sleep apnea- sisters    Active Problems, Allergy List, Medication List, and PMH/PSH/FH/Social Hx have been reviewed and reconciled in chart. No significant changes unless documented in the pertinent chart section. Updates made when necessary.       Objective   Vitals:    12/18/24 1329   BP: 145/85   Pulse: 100   Temp: 36.8 °C (98.3 °F)   SpO2: 100%       REVIEW OF SYSTEMS  All other systems have been reviewed and are negative.    ALLERGIES  No Known Allergies    MEDICATIONS  Current Outpatient Medications   Medication Sig Dispense Refill    acetaminophen (Tylenol 8 Hour) 650 mg ER tablet Take 1 tablet (650 mg) by mouth every 8 hours if needed for mild pain (1 - 3). Do not crush, chew, or split. 90 tablet 11    amLODIPine (Norvasc) 10 mg  tablet Take 1 tablet (10 mg) by mouth once daily. 90 tablet 1    blood pressure monitor kit Use to test blood pressure once daily or as directed 1 kit 0    cholecalciferol (Vitamin D3) 50 mcg (2,000 unit) capsule Take 1 capsule (50 mcg) by mouth once daily.      Clenpiq 10 mg-3.5 gram- 12 gram/175 mL solution       diclofenac sodium (Voltaren) 1 % gel Apply 4.5 inches (4 g) topically 4 times a day. 100 g 1    empagliflozin (Jardiance) 10 mg Take 1 tablet (10 mg) by mouth once daily. 90 tablet 3    ergocalciferol (Vitamin D-2) 1.25 MG (56281 UT) capsule Take 1 capsule (1,250 mcg) by mouth every 14 (fourteen) days. TAKE 1 CAPSULE twice monthly on the 15th and the 30 th for vitamin d deficiency 6 capsule 2    fluticasone (Flonase) 50 mcg/actuation nasal spray Administer 2 sprays into each nostril once daily. 16 g 3    ketoconazole (NIZOral) 2 % shampoo Appy  to the scalp  and let soak for 15 minutes then rinse 120 mL 2    latanoprost (Xalatan) 0.005 % ophthalmic solution Administer 1 drop into both eyes once daily at bedtime.      levothyroxine (Synthroid, Levoxyl) 137 mcg tablet Take 1 tablet (137 mcg) by mouth once daily. 90 tablet 3    lisinopril 20 mg tablet Take 1 tablet (20 mg) by mouth once daily. 100 tablet 1    lisinopriL-hydrochlorothiazide 20-25 mg tablet Take 1 tablet by mouth once daily. 90 tablet 1    metoprolol tartrate (Lopressor) 25 mg tablet Take 1 tablet (25 mg) by mouth 2 times a day. 90 tablet 2    topiramate (Topamax) 50 mg tablet Take 1 tablet (50 mg) by mouth 2 times a day. (Patient taking differently: Take 1 tablet (50 mg) by mouth once daily.) 90 tablet 2    triamcinolone (Kenalog) 0.1 % ointment Apply to scalp twice daily (Patient taking differently: Apply to scalp twice daily three times weekly) 80 g 1     No current facility-administered medications for this visit.       Physical Exam  Constitutional:alert and oriented to time, place, and person  Sinus: - tenderness to palpation  Palate:  Narrow Mallampati 3, + Tongue scalloping, + macroglossia  Lungs: Clear to auscultation bilateral, no rales  Heart: Regular rate and rhythm, no murmurs    Assessment/Plan   Barbara Schultz is a 69 y.o. female who is seen to evaluate for mild obstructive sleep apnea. The pathophysiology of sleep apnea, diagnostic testing (HST vs PSG), treatment options (PAP, oral appliance, surgery, hypoglossal nerve stimulator called Inspire), and supportive management (weight loss, positional therapy, smoking cessation, avoidance of alcohol and sedatives) were discussed with the patient in detail. Risk factors of sleep apnea as well as cardiometabolic and neurocognitive sequelae associated with untreated sleep apnea were also discussed. Lastly, patient was advised to avoid driving vehicle or operating heavy machinery when sleepy.     Barbara Schultz with the following problems:     # OBSTRUCTIVE SLEEP APNEA :  -Start 5-15  cmH20 auto CPAP through [employer].  -Proceed overnight oximetry monitor via the [employer].   -Sleep apnea and PAP therapy education were provided at length in the clinic today. Barbara Schultz verbalized understanding.  -Emphasized diet, exercise, and weight loss in the clinic, as were non-supine sleep, avoiding alcohol in the late evening, and driving or operating heavy machinery when sleepy.  -Barbara Estebanjameverbalizes understanding of the above instructions and risks.    # CHRONIC SLEEP ONSET/ SLEEP MAINTENANCE INSOMNIA:  -likely due to poor sleep hygiene, and untreated sleep apnea.  -RACHEL: 9  -Sleep hygiene discuss in the clinic.    # HYPERTENSION:  -Blood pressure was 145/85 today. To control the blood pressure better, instruct the patient to take anti-hypertension medication at bedtime and a water pill in the waking time.  -Denies headache, palpitation, and syncope in the clinic.  -Follows with PCP/ Cardiology     # MORBID OBESITY:  -with a BMI of 53.7. Barbaracyndee Schultz most recent  Bicarbonate was 27  Bicarbonate   Date Value Ref Range Status   06/19/2024 27 21 - 32 mmol/L Final   -Encourage to have regular exercise to manage weight well.    # XEROSTOMIA:  -Instruct Barbara Gambino purchase the Biotene gel to ease the dry mouth symptom,       RTC .1 month after receiving CPAP       All of patient's questions were answered. She verbalizes understanding and agreement with my assessment and plan.

## 2024-12-18 ENCOUNTER — OFFICE VISIT (OUTPATIENT)
Dept: SLEEP MEDICINE | Facility: HOSPITAL | Age: 69
End: 2024-12-18
Payer: COMMERCIAL

## 2024-12-18 VITALS
BODY MASS INDEX: 53.7 KG/M2 | TEMPERATURE: 98.3 F | WEIGHT: 293 LBS | OXYGEN SATURATION: 100 % | SYSTOLIC BLOOD PRESSURE: 145 MMHG | DIASTOLIC BLOOD PRESSURE: 85 MMHG | HEART RATE: 100 BPM

## 2024-12-18 DIAGNOSIS — G47.33 OSA (OBSTRUCTIVE SLEEP APNEA): Primary | ICD-10-CM

## 2024-12-18 DIAGNOSIS — G47.33 OBSTRUCTIVE SLEEP APNEA (ADULT) (PEDIATRIC): ICD-10-CM

## 2024-12-18 DIAGNOSIS — E66.01 OBESITY, MORBID, BMI 50 OR HIGHER (MULTI): ICD-10-CM

## 2024-12-18 DIAGNOSIS — I10 ESSENTIAL HYPERTENSION: ICD-10-CM

## 2024-12-18 DIAGNOSIS — F33.9 DEPRESSION, RECURRENT (CMS-HCC): ICD-10-CM

## 2024-12-18 DIAGNOSIS — D50.9 IRON DEFICIENCY ANEMIA, UNSPECIFIED IRON DEFICIENCY ANEMIA TYPE: ICD-10-CM

## 2024-12-18 DIAGNOSIS — E55.9 VITAMIN D DEFICIENCY: ICD-10-CM

## 2024-12-18 DIAGNOSIS — G47.9: ICD-10-CM

## 2024-12-18 PROBLEM — Z86.39 HISTORY OF OBESITY: Status: RESOLVED | Noted: 2024-10-09 | Resolved: 2024-12-18

## 2024-12-18 PROCEDURE — 3077F SYST BP >= 140 MM HG: CPT

## 2024-12-18 PROCEDURE — 1036F TOBACCO NON-USER: CPT

## 2024-12-18 PROCEDURE — 1159F MED LIST DOCD IN RCRD: CPT

## 2024-12-18 PROCEDURE — 99204 OFFICE O/P NEW MOD 45 MIN: CPT

## 2024-12-18 PROCEDURE — 99214 OFFICE O/P EST MOD 30 MIN: CPT

## 2024-12-18 PROCEDURE — 1125F AMNT PAIN NOTED PAIN PRSNT: CPT

## 2024-12-18 PROCEDURE — 3079F DIAST BP 80-89 MM HG: CPT

## 2024-12-18 PROCEDURE — 1160F RVW MEDS BY RX/DR IN RCRD: CPT

## 2024-12-18 ASSESSMENT — SLEEP AND FATIGUE QUESTIONNAIRES
HOW LIKELY ARE YOU TO NOD OFF OR FALL ASLEEP WHEN YOU ARE A PASSENGER IN A CAR FOR AN HOUR WITHOUT A BREAK: SLIGHT CHANCE OF DOZING
WAKING_TOO_EARLY: MODERATE
SLEEP_PROBLEM_NOTICEABLE_TO_OTHERS: A LITTLE
SLEEP_PROBLEM_INTERFERES_DAILY_ACTIVITIES: SOMEWHAT
HOW LIKELY ARE YOU TO NOD OFF OR FALL ASLEEP WHILE SITTING QUIETLY AFTER LUNCH WITHOUT ALCOHOL: WOULD NEVER DOZE
HOW LIKELY ARE YOU TO NOD OFF OR FALL ASLEEP WHILE SITTING AND TALKING TO SOMEONE: WOULD NEVER DOZE
HOW LIKELY ARE YOU TO NOD OFF OR FALL ASLEEP WHILE SITTING AND READING: SLIGHT CHANCE OF DOZING
HOW LIKELY ARE YOU TO NOD OFF OR FALL ASLEEP WHILE WATCHING TV: WOULD NEVER DOZE
DIFFICULTY_STAYING_ASLEEP: MILD
SITING INACTIVE IN A PUBLIC PLACE LIKE A CLASS ROOM OR A MOVIE THEATER: WOULD NEVER DOZE
SATISFACTION_WITH_CURRENT_SLEEP_PATTERN: SATISFIED
HOW LIKELY ARE YOU TO NOD OFF OR FALL ASLEEP IN A CAR, WHILE STOPPED FOR A FEW MINUTES IN TRAFFIC: WOULD NEVER DOZE
ESS-CHAD TOTAL SCORE: 4
WORRIED_DISTRESSED_DUE_TO_SLEEP: A LITTLE
HOW LIKELY ARE YOU TO NOD OFF OR FALL ASLEEP WHILE LYING DOWN TO REST IN THE AFTERNOON WHEN CIRCUMSTANCES PERMIT: MODERATE CHANCE OF DOZING

## 2024-12-18 ASSESSMENT — PAIN SCALES - GENERAL: PAINLEVEL_OUTOF10: 8

## 2024-12-18 NOTE — PATIENT INSTRUCTIONS
[facility] Sleep Medicine  [facility] [place]  [address]  [facility] [address] [phone]       Thank you for coming to the Sleep Medicine Clinic today! Your sleep medicine provider today was: PERRY Salazar Below is a summary of your treatment plan, patient education, other important information, and our contact numbers.    Dear Ms Israel       Your Sleep Provider Today: PERRY Salaazr  Your Primary Care Physician: PERRY Mullins   Your Referring Provider: Rhonda Bhat APRN*    Diagnosis: OBSTRUCTIVE SLEEP APNEA       Thank you for visiting  Sleep Medicine Clinic !     1. According to your symptom and sleep study report. I will order the new PAP device for you to control your sleep apnea, feel free to contact your DME.   If [employer] is your DME, you can reach them at [phone].   If the insurance declines the pap order, we might need another sleep study to confirm your diagnosis.    2. Please do not drive when you are sleepy and start practicing the sleep hygiene as discussed in clinic.    3. Please start using Biotene to ease your dry mouth if needed.    4. Your blood pressure was elevated in the office today. Please obtain a home blood pressure monitoring kit, log your blood pressure at home, and follow up with your primary care physician. To control your blood pressure better, please take anti-hypertension medication at bedtime and a water pill at waking time.    5. FOR QUESTIONS AND CONCERNS:   a) : In case of problems with machine or mask interface, please contact your DME company first. DME is the company who provides you the machine and/or PAP supplies / accessories. If [employer] is your DME, you can reach them at [phone].   b): Please call my office with issues or questions: [phone] (Adonay); [phone] (Denys); [phone] (Ramón)    If you  "have a CPAP or BiPAP machine at home, please bring the unit and all accessories including the power cord to your appointments unless I tell you otherwise. Please have knowledge of the DME company you worked with to receive your PAP device. If you have copies of any previous sleep testing completed outside of , please bring with you to clinic as well. This information will make our visits more productive.     If you are new to CPAP or BiPAP, please note the minimum usage insurance requires to continuing coverage for the equipment as noted by your DME company. Please discuss equipment issues (PAP unit, mask fit, humidification, etc.) with your DME company first.       In the event that you are running more than 10 minutes late to your appointment, I will kindly ask you to reschedule. Thanks.      TREATMENT PLAN     - Start auto-CPAP. Order placed and sent to [facility].  - Please read the \"Patient Education\" section below for more detailed information. Try implementing tips, reminders, strategies, and supportive management.   - If not yet done, please sign up for [employer] to make a future schedule, send prescription requests, or send messages.    Follow-up Appointment:   Follow-up in 31 days after getting new machine.    PATIENT EDUCATION     OBSTRUCTIVE SLEEP APNEA (JAVIER) is a sleep disorder where your upper airway muscles relax during sleep and the airway intermittently and repetitively narrows and collapses leading to partially blocked airway (hypopnea) or completely blocked airway (apnea) which, in turn, can disrupt breathing in sleep, lower oxygen levels while you sleep and cause night time wakings. Because both apnea and hypopnea may cause higher carbon dioxide or low oxygen levels, untreated JAVIER can lead to heart arrhythmia, elevation of blood pressure, and make it harder for the body to consolidate memory and facilitate metabolism (leading to higher blood sugars at night). Frequent partial " arousals occur during sleep resulting in sleep deprivation and daytime sleepiness. JAVIER is associated with an increased risk of cardiovascular disease, stroke, hypertension, and insulin resistance. Moreover, untreated JAVIER with excessive daytime sleepiness can increase the risk of motor vehicular accidents.    Below are conservative strategies for JAVIER regardless of JAVIER severity are:   Positional therapy - Avoid sleeping on your back.   Healthy diet and regular exercise to optimize weight is highly encouraged.   Avoid alcohol late in the evening and sedative-hypnotics as these substances can make sleep apnea worse.   Improve breathing through the nose with intranasal steroid spray, saline rinse, or antihistamines    Safety: Avoid driving vehicle and operating heavy equipment while sleepy. Drowsy driving may lead to life-threatening motor vehicle accidents. A person driving while sleepy is 5 times more likely to have an accident. If you feel sleepy, pull over and take a short power nap (sleep for less than 30 minutes). Otherwise, ask somebody to drive you.    Treatment options for sleep apnea include weight management, positional therapy, Positive Airway Therapy (PAP) therapy, oral appliance therapy, hypoglossal nerve stimulator (Inspire) and select airway surgeries.    Starting Positive Airway Pressure (PAP): You were ordered a device to wear when you sleep called PAP (Positive Airway Pressure) to treat your sleep apnea. The order will be submitted to a durable medical equipment (DME) company who will arrange setting you up with the device. They will provide all the necessary equipment and discuss use and maintenance of the device with you as well as mask fitting and process of replacing / renewing PAP supplies or accessories. Once you get the machine, please start using it immediately. You may not be successful right away and that is okay. [place] be certain that you keep trying nightly and reach out to DME if you are  struggling or having issues with machine usage.     *Please follow-up with me in 1-2 months of starting CPAP to see how well it is working for you and to do some troubleshooting if needed. Also, please bring all PAP equipment with you to follow up appointments unless told otherwise.     Important things to keep in mind as you start PAP:  Insurance will monitor your usage during the first 90 days. You should use your PAP - all night, every night, and including all naps (especially if naps are more than 30 minutes) for your health. The bare minimum is to use your PAP device while sleeping for at least 4 hours per day at least 5-6 days per week.. Otherwise, your PAP device will be reclaimed by your DME company at 90 days.  There are many masks to choose from to wear with your PAP machine. If you are not comfortable with the first mask issued to you, call your DME company and ask for another option to try. You typically have a 30-day mask guarantee from the day you received your machine.   Discuss with your provider if you are having issues breathing with the machine or if the temperature or humidity feel uncomfortable.  Expect to have an adjustment period when you start your device. It helps to continuing wearing the machine every day for a period of time until you get more used to it. You can practice with wearing the mask alone if you need, then add in the PAP air pressure a few days later.   Reach out for help if you are struggling! The sleep medicine department can be reached at [phone])  We encourage you to download data monitoring apps to your phone. For [employer] 10/11 - MyAir george. For [employer] - DreamMapper. Both apps are available in the [employer] store for free and are a great tool to monitor your progress with your PAP device night to night.    Tips for success with PAP machine usage:  Comfortable and well-fitting mask  Appropriate pressure on the machine  Using humidification  Support  from bed partner and clinical team      Maintaining your CPAP/BPAP device:    The humidification chamber (aka water tank or water chamber) needs to be filled with distilled water to prevent buildup of white deposits in the future. If you cannot find distilled water, you can use tap water but expect to have white deposits buildup seen after prolonged use with tap water. If you start seeing white deposits on the water chamber, you can clean it by filling it with equal parts of distilled white vinegar and water. Let the vinegar-water mixture sit for 2 hours, and then rinse it with running tap water. Clean with soap and water then let it dry.     You should try to keep your machine clean in order to work well. Here are some tips to clean PAP supplies / accessories:    Clean the humidification chamber (aka water tank) as well as your mask and tubing at least once a week with soap and water.   Alternatively, you can fill a sink or basin with warm water and add a little mild detergent, like Ivory dish soap. Gently wipe your supplies with the soapy water to free all the oils and dirt that may have collected. Once that's done, rinse these items with clean water until the soap is gone and let them air dry. You can hang your tubing over the curtain dulce in your bathroom so that it dries.  The mask insert (part of the mask that has contact with your skin) needs to be cleaned with soap and water daily. Another option is to wipe them down with CPAP wipes or baby wipes.    You should replace your mask and tubing frequently in order to prevent bacteria buildup, machine damage, and mask seal issues. The older the mask and hose, the high likelihood that there is bacteria buildup in it especially if they are not cleaned regularly. Dirty filters damage machines because build-up of dust and contaminants can cause machine to over-heat, and in time, damage the motor of machine. Cushions lose their seal over time as most masks are made of  plastic and silicone while headgear is made of neoprene. These materials will break down with age and frequent use. Here is the recommended replacement schedule for PAP supplies / accessories:    Twice a month- disposable filters and cushions for nasal mask or nasal pillows.  Once a month- cushion for full face mask  Every 3 months- mask with headgear and PAP tubing (standard or heated hose)  Every 6 months- reusable filter, water chamber, and chin strap     Other useful information:    Some people do not put water in the tank while other people prefers to put water in the tank to prevent mouth dryness. Try to experiment to determine which is more comfortable for you.   In general, new machines have 2 years warranty on parts while health insurance allows you to have a new machine once every 5 years.     Common issues with PAP machine:    Mask gets dislodged when turning to the side: Consider getting a CPAP pillow or switching to a mask with hose on top.     Dry mouth:  Your machine has built-in humidifier that heats up the air to prevent dry mouth. It can be adjusted to your comfort. You can try that first and increase setting one level one night at a time to check which setting is comfortable and effective in lessening dry mouth. In some patients with heated hose, adjusting tube temperature to make air warmer can improve dry mouth. If dry mouth persists despite adjusting humidity or tube temperature setting, may apply OTC Biotene gel over the gums at bedtime.  If Biotene gel is not effective, consider trying XEROSTOM gel from [employer].  Also, eliminate or reduce dose of medications that can cause dry mouth if possible. Lastly, may try getting a separate room humidifier machine.    Airleaks: Please call DME as they may need to adjust your mask or refit you with a different kind or different size of mask. In addition, you can ask DME for tips on getting a good mask seal and mask fit.     Difficulty tolerating the  "mask: Contact your DME to try a different kind of mask and/or call office to get a referral to Sleep Psychologist for CPAP desensitization. CPAP desensitization technique is a set of strategies that helps patient cope with claustrophobia and anxiety related to wearing mask. Alternatively, we can do a daytime mini-sleep study called PAP-nap trial wherein you will try on different kinds of mask and the sleep technician will try different pressure settings on CPAP and BPAP machines to see which specific pressure is tolerable and comfortable for you.     Water droplets or moisture within the hose and/or mask: This is called rain-out and it is caused by condensation of too much heated humidity on the cooler walls of the hose. If you have rain-out, turn down humidity settings or get a heated hose. If you already have a heated hose, turn up the \"tube temperature\" of the heated hose. Alternatively, if you don't want to get a heated hose or warmer air, may wrap the CPAP hose with stockings to keep it somewhat warm. Also, you need to place the machine on the floor and lower the hose so that water won't travel upward towards your mask.     PAP desensitization techniques: If you have concerns about something being on your face at night, you can start by getting used to it before trying to sleep with it as follows:      Sit in a comfortable chair or bed. Connect the mask and hose to the CPAP/BPAP machine. Hold the mask on your face (without straps on) and turn on the machine. Practice breathing with the mask on while awake sitting and watching television, reading, or performing a sedentary activity during the day for 5-10 minutes and then take it off.  If tolerated, try again and gradually build up to longer periods of time. If not tolerated, try and try again until it is more comfortable as you become more desensitized. If you are able to use it for at least 20-30 minutes, move unto the next step.     Sit in a comfortable chair " or bed. Connect the mask and hose to the CPAP/BPAP machine. Strap the mask on your head and turn on the machine. Practice breathing with the mask and headgear on while awake sitting and watching television, reading, or performing a sedentary activity. Start with 5-10 minutes and gradually increasing time until you can wear it comfortably for at least 20-30 minutes, then move to the next step.    Take a shorter daytime nap with machine turned on while you are in a reclined position in bed, sofa, or recliner. Start with 5-10 minute nap and gradually increase up to 30 minutes. It is not important whether you fall asleep or not. The goal is to rest comfortably with PAP machine on.     Reintroduce PAP machine into nighttime sleep. You can begin using it a portion of the night and gradually increase up to entire night.     Proceed from one step to the next only when you are completely comfortable. If you feel any anxiety or discomfort, return to the previous step, then proceed again when comfortable.    Expect to “work” with your CPAP/BIPAP unit. It is important to try to relax when beginning CPAP/BIPAP therapy. Inhalation and exhalation should occur through the nose only. If you are unable to consistently breathe this way, do not panic or lose hope. There are other types of masks which allow you to breathe through your nose and/or your mouth. Also, in some patients, using intranasal steroid spray (e.g. Flonase or Nasocort or Fluticasone) 1 hour before bedtime and/or before putting on CPAP mask can help tolerate breathing through the mask.    Don't give up after a few attempts--some patients adjust quickly, while some patients need 3-4 weeks (or sometimes even longer) to be accustomed to CPAP therapy.  Contact your sleep medicine specialist if you have a significant change in weight since this may affect your pressure.    You can also go to the following EDUCATION WEBSITES for further information:   American Academy of  Sleep Medicine http://sleepeducation.org  National Sleep Foundation: https://sleepfoundation.org  American Sleep Apnea Association: https://www.sleepapnea.org (for patients with sleep apnea)  Narcolepsy Network: https://www.narcolepsynetwork.org (for patients with narcolepsy)  [employer]: https://www.[employer].org (for patients with narcolepsy)  Hypersomnia Foundation: https://www.hypersomniafoundation.org (for patients with idiopathic hypersomnia)  RLS foundation: https://www.rls.org (for patients with restless leg syndrome)    IMPORTANT INFORMATION     Call 911 for medical emergencies.  Our offices are generally open from Monday-Friday, 8 am - 5 pm.   There are no supporting services by either the sleep doctors or their staff on weekends and Holidays, or after 5 PM on weekdays.   If you need to get in touch with me, you may either call my office number or you can use [employer].  If a referral for a test, for CPAP, or for another specialist was made, and you have not heard about scheduling this within a week, please call scheduling at [phone] ([phone]).  If you are unable to make your appointment for clinic or an overnight study, kindly call the office or sleep testing center at least 48 hours in advance to cancel and reschedule.  If you are on CPAP, please bring your device's card and/or the device to each clinic appointment.   In case of problems with PAP machine or mask interface, please contact your [employer] (Durable Medical Equipment) company first. [employer] is the company who provides you the machine and/or PAP supplies.       PRESCRIPTIONS     We require 7 days advanced notice for prescription refills. If we do not receive the request in this time, we cannot guarantee that your medication will be refilled in time.    IMPORTANT PHONE NUMBERS     Sleep Medicine Clinic Fax: [phone]  Appointments (for Pediatric Sleep Clinic): [phone] - option 1  Appointments (for Adult Sleep Clinic):  [phone] - option 2  Appointments (For Sleep Studies): [phone] - option 3  Behavioral Sleep Medicine: [phone]  Sleep Surgery: [phone]  Nutrition Service: [phone]  Weight management clinics with endocrinology: [phone]  Bariatric Services: [phone] (includes weight loss medications and weight loss surgery)  [facility]: [phone] (offers holistic approaches to weight management)  ENT (Otolaryngology): [phone]  Headache Clinic (Neurology): [phone]  Neurology: [phone]  Psychiatry: [phone]  Pulmonary Function Testing (PFT) Center: [phone]  Pulmonary Medicine: [phone]  [place] (1-800-DOCTORS): [phone]      OUR SLEEP TESTING LOCATIONS     Our team will contact you to schedule your sleep study, however, you can contact us as follow:  Main Phone Line (scheduling only): [phone]), option 3  Adult and Pediatric Locations  [facility] (6 years and older): [facility] - 4th floor ([facility]) After hours line: [phone]  [facility] (Main campus: All ages): [facility], 6th floor. After hours line: [phone]   [place] (5 years and older; younger considered on case-by-case basis): [address]; [address], [address]. Scheduling  After hours line: [phone]   [place] (6 years and older): [address]; [address]; [address]   [place] (6 years and older): [address], [address]  After hours line: [phone]   [place] (13 years and older) in [place]: [address], 2nd floor  After hours line: [phone]   [place] (13 year and older): [address]  After hours line: [phone]     Adult Only Locations:   [place] (18 years and older): [address] [facility], 2nd floor   [place] (18 years and older): [address]; 4th floor  After hours line:  [phone]   [place] (18 years and older) at [place]: [address] [facility] [place]  After hours line: [phone]     CONTACTING YOUR SLEEP MEDICINE PROVIDER AND SLEEP TEAM      For issues with your machine or mask interface, please call your DME provider first. [employer] stands for durable medical company. [employer] is the company who provides you the machine and/or PAP supplies / accessories.   To schedule, cancel, or reschedule SLEEP STUDY APPOINTMENTS, please call the Main Phone Line at [phone] - option 3.   To schedule, cancel, or reschedule CLINIC APPOINTMENTS, you can do it in \"MyChart\", call [phone] to speak with my  (Lizzy Andre), or call the Main Phone Line at [phone] - option 2  For CLINICAL QUESTIONS or MEDICATION REFILLS, please call direct line for Adult Sleep Nurses at [phone].   Lastly, you can also send a message directly to your provider through \"My Chart\", which is the email service through your  Records Account: [email or web address]       Here at [facility], we wish you a restful sleep!   "

## 2025-07-11 ENCOUNTER — APPOINTMENT (OUTPATIENT)
Dept: PRIMARY CARE | Facility: CLINIC | Age: 70
End: 2025-07-11
Payer: COMMERCIAL

## 2025-07-15 ENCOUNTER — APPOINTMENT (OUTPATIENT)
Dept: OPHTHALMOLOGY | Facility: CLINIC | Age: 70
End: 2025-07-15
Payer: COMMERCIAL

## 2025-07-15 DIAGNOSIS — H40.053 OCULAR HYPERTENSION, BILATERAL: Primary | ICD-10-CM

## 2025-07-15 DIAGNOSIS — H25.13 NUCLEAR SENILE CATARACT OF BOTH EYES: ICD-10-CM

## 2025-07-15 DIAGNOSIS — H40.023 OAG (OPEN ANGLE GLAUCOMA) SUSPECT, HIGH RISK, BILATERAL: ICD-10-CM

## 2025-07-15 DIAGNOSIS — H52.13 MYOPIA WITH PRESBYOPIA OF BOTH EYES: ICD-10-CM

## 2025-07-15 DIAGNOSIS — H52.4 MYOPIA WITH PRESBYOPIA OF BOTH EYES: ICD-10-CM

## 2025-07-15 PROBLEM — H53.9 CHANGES IN VISION: Status: RESOLVED | Noted: 2023-02-26 | Resolved: 2025-07-15

## 2025-07-15 PROBLEM — H40.9 GLAUCOMA: Status: RESOLVED | Noted: 2023-02-26 | Resolved: 2025-07-15

## 2025-07-15 PROCEDURE — 92133 CPTRZD OPH DX IMG PST SGM ON: CPT | Performed by: STUDENT IN AN ORGANIZED HEALTH CARE EDUCATION/TRAINING PROGRAM

## 2025-07-15 PROCEDURE — 92014 COMPRE OPH EXAM EST PT 1/>: CPT | Performed by: STUDENT IN AN ORGANIZED HEALTH CARE EDUCATION/TRAINING PROGRAM

## 2025-07-15 RX ORDER — LATANOPROST 50 UG/ML
1 SOLUTION/ DROPS OPHTHALMIC NIGHTLY
Qty: 7.5 ML | Refills: 3 | Status: SHIPPED | OUTPATIENT
Start: 2025-07-15 | End: 2025-10-13

## 2025-07-15 ASSESSMENT — REFRACTION_WEARINGRX
OS_SPHERE: -1.25
OD_SPHERE: -1.25
OS_ADD: +2.50
OS_AXIS: 040
OS_CYLINDER: -0.75
OD_ADD: +2.50

## 2025-07-15 ASSESSMENT — REFRACTION_MANIFEST
OD_SPHERE: -1.25
OS_ADD: +2.50
OS_SPHERE: -2.00
OD_ADD: +2.50
OD_CYLINDER: SPHERE
OS_CYLINDER: +0.75
OS_AXIS: 130

## 2025-07-15 ASSESSMENT — EXTERNAL EXAM - RIGHT EYE: OD_EXAM: NORMAL

## 2025-07-15 ASSESSMENT — CUP TO DISC RATIO
OD_RATIO: 0.80
OS_RATIO: 0.70

## 2025-07-15 ASSESSMENT — CONF VISUAL FIELD
OD_SUPERIOR_TEMPORAL_RESTRICTION: 0
METHOD: COUNTING FINGERS
OD_INFERIOR_NASAL_RESTRICTION: 0
OD_NORMAL: 1
OD_SUPERIOR_NASAL_RESTRICTION: 0
OS_NORMAL: 1
OS_INFERIOR_TEMPORAL_RESTRICTION: 0
OS_SUPERIOR_TEMPORAL_RESTRICTION: 0
OS_SUPERIOR_NASAL_RESTRICTION: 0
OD_INFERIOR_TEMPORAL_RESTRICTION: 0
OS_INFERIOR_NASAL_RESTRICTION: 0

## 2025-07-15 ASSESSMENT — SLIT LAMP EXAM - LIDS
COMMENTS: DERMATOCHALASIS
COMMENTS: DERMATOCHALASIS

## 2025-07-15 ASSESSMENT — ENCOUNTER SYMPTOMS
NEUROLOGICAL NEGATIVE: 0
HEMATOLOGIC/LYMPHATIC NEGATIVE: 0
CONSTITUTIONAL NEGATIVE: 0
CARDIOVASCULAR NEGATIVE: 0
EYES NEGATIVE: 0
RESPIRATORY NEGATIVE: 0
MUSCULOSKELETAL NEGATIVE: 0
ALLERGIC/IMMUNOLOGIC NEGATIVE: 0
ENDOCRINE NEGATIVE: 0
PSYCHIATRIC NEGATIVE: 0
GASTROINTESTINAL NEGATIVE: 0

## 2025-07-15 ASSESSMENT — REFRACTION
OS_CYLINDER: +0.75
OD_CYLINDER: SPHERE
OS_AXIS: 130
OS_ADD: +2.50
OD_ADD: +2.50
OS_SPHERE: -2.50
OD_SPHERE: -1.50

## 2025-07-15 ASSESSMENT — TONOMETRY
OS_IOP_MMHG: 22
OD_IOP_MMHG: 26
IOP_METHOD: GOLDMANN APPLANATION

## 2025-07-15 ASSESSMENT — VISUAL ACUITY
CORRECTION_TYPE: GLASSES
OD_CC: 20/30-2
METHOD: SNELLEN - LINEAR
OS_CC: 20/50+2
OD_PH_CC: 20/25+1
OS_PH_CC: 20/25-1

## 2025-07-15 ASSESSMENT — EXTERNAL EXAM - LEFT EYE: OS_EXAM: NORMAL

## 2025-07-15 NOTE — PROGRESS NOTES
Bilateral myopia  Bilateral presbyopia  -New spec rx released today per patient request. Ocular health wnl for age OU. Monitor 1 year or sooner prn. Refraction billed today.    Age-related nuclear cataract of both eyes  -mild visual significance. Monitor.     Ocular Hypertension Bilateral  High Risk primary open angle glaucoma (POAG) suspect   -patient lost to f/u since 8/2023  -patient was started on Latanoprost at exam 8/2023 but has not been taking/reports poor compliance   -IOP today 26/22 c LIOP 23/22  -hx of being treated for glaucoma but stopped many years ago  -(+)FHX siblings  -OCT RNFL baseline 8/15/23  OD: AVG RNFL, 67um, sup and inf thinning; disc diameter 2.69  OS: AVG RNFL 98um, all quad wnl; disc diameter 2.53  -OCT RNFL today 7/15/25  OD: sup quad thinning stable OS: normal and stable  -large discs c corresponding cupping OD>OS  -with hx, (+)FHX, and IOP will start on Latanoprost QHS OU  -given stability in RNFL patient a high risk suspect with OHTN  -pt ed on importance of TXT compliance to prevent irreversible vision changes/loss and progression for glaucoma   -RTC for IOP check 8 weeks     RTC 8 weeks for IOP check, gonio, HVF 24-2

## 2025-09-09 ENCOUNTER — APPOINTMENT (OUTPATIENT)
Dept: OPHTHALMOLOGY | Facility: CLINIC | Age: 70
End: 2025-09-09
Payer: COMMERCIAL